# Patient Record
Sex: MALE | Race: WHITE | Employment: FULL TIME | URBAN - METROPOLITAN AREA
[De-identification: names, ages, dates, MRNs, and addresses within clinical notes are randomized per-mention and may not be internally consistent; named-entity substitution may affect disease eponyms.]

---

## 2018-09-07 ENCOUNTER — HOSPITAL ENCOUNTER (EMERGENCY)
Age: 38
Discharge: LEFT BEFORE DISCHARGE | End: 2018-09-07
Attending: EMERGENCY MEDICINE
Payer: SELF-PAY

## 2018-09-07 VITALS
HEIGHT: 71 IN | HEART RATE: 77 BPM | TEMPERATURE: 97.9 F | OXYGEN SATURATION: 99 % | BODY MASS INDEX: 28 KG/M2 | RESPIRATION RATE: 16 BRPM | DIASTOLIC BLOOD PRESSURE: 88 MMHG | SYSTOLIC BLOOD PRESSURE: 126 MMHG | WEIGHT: 200 LBS

## 2018-09-07 PROCEDURE — 75810000275 HC EMERGENCY DEPT VISIT NO LEVEL OF CARE

## 2018-09-07 NOTE — ED NOTES
Patient reports URI last week. Rash x 4 days, and worsening diffuse joint swelling most notable to hands. Also left periorbital swelling starting today. Patient works outside I performed a brief evaluation, including history and physical, of the patient here in triage and I have determined that pt will need further treatment and evaluation from the main side ER physician. I have placed initial orders to help in expediting patients care. September 07, 2018 at 4:31 PM - Tara Alexis NP Visit Vitals  /88 (BP 1 Location: Right arm, BP Patient Position: At rest)  Pulse 77  Temp 97.9 °F (36.6 °C)  Resp 16  
 Ht 5' 11\" (1.803 m)  Wt 90.7 kg (200 lb)  SpO2 99%  BMI 27.89 kg/m2

## 2018-09-07 NOTE — ED TRIAGE NOTES
Last week sick with URI. This week broke out with rash all over, joint swelling and hands and face swollen. 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 Itches

## 2018-09-08 ENCOUNTER — HOSPITAL ENCOUNTER (EMERGENCY)
Age: 38
Discharge: HOME | End: 2018-09-08
Attending: EMERGENCY MEDICINE
Payer: SELF-PAY

## 2018-09-08 VITALS
WEIGHT: 199.96 LBS | HEART RATE: 70 BPM | TEMPERATURE: 97.7 F | RESPIRATION RATE: 16 BRPM | DIASTOLIC BLOOD PRESSURE: 99 MMHG | HEIGHT: 71 IN | BODY MASS INDEX: 27.99 KG/M2 | OXYGEN SATURATION: 100 % | SYSTOLIC BLOOD PRESSURE: 148 MMHG

## 2018-09-08 DIAGNOSIS — L01.00 IMPETIGO: Primary | ICD-10-CM

## 2018-09-08 LAB
ANION GAP SERPL CALC-SCNC: 5 MMOL/L (ref 3–18)
BASOPHILS # BLD: 0.1 K/UL (ref 0–0.1)
BASOPHILS NFR BLD: 1 % (ref 0–2)
BUN SERPL-MCNC: 14 MG/DL (ref 7–18)
BUN/CREAT SERPL: 19 (ref 12–20)
CALCIUM SERPL-MCNC: 8.6 MG/DL (ref 8.5–10.1)
CHLORIDE SERPL-SCNC: 110 MMOL/L (ref 100–108)
CO2 SERPL-SCNC: 27 MMOL/L (ref 21–32)
CREAT SERPL-MCNC: 0.73 MG/DL (ref 0.6–1.3)
CRP SERPL-MCNC: 0.6 MG/DL (ref 0–0.3)
DIFFERENTIAL METHOD BLD: ABNORMAL
EOSINOPHIL # BLD: 0.2 K/UL (ref 0–0.4)
EOSINOPHIL NFR BLD: 4 % (ref 0–5)
ERYTHROCYTE [DISTWIDTH] IN BLOOD BY AUTOMATED COUNT: 13.3 % (ref 11.6–14.5)
ERYTHROCYTE [SEDIMENTATION RATE] IN BLOOD: 4 MM/HR (ref 0–15)
GLUCOSE SERPL-MCNC: 90 MG/DL (ref 74–99)
HCT VFR BLD AUTO: 43.9 % (ref 36–48)
HGB BLD-MCNC: 14.1 G/DL (ref 13–16)
LYMPHOCYTES # BLD: 2.2 K/UL (ref 0.9–3.6)
LYMPHOCYTES NFR BLD: 38 % (ref 21–52)
MCH RBC QN AUTO: 28 PG (ref 24–34)
MCHC RBC AUTO-ENTMCNC: 32.1 G/DL (ref 31–37)
MCV RBC AUTO: 87.1 FL (ref 74–97)
MONOCYTES # BLD: 0.8 K/UL (ref 0.05–1.2)
MONOCYTES NFR BLD: 14 % (ref 3–10)
NEUTS SEG # BLD: 2.5 K/UL (ref 1.8–8)
NEUTS SEG NFR BLD: 43 % (ref 40–73)
PLATELET # BLD AUTO: 283 K/UL (ref 135–420)
PMV BLD AUTO: 9.3 FL (ref 9.2–11.8)
POTASSIUM SERPL-SCNC: 4.2 MMOL/L (ref 3.5–5.5)
RBC # BLD AUTO: 5.04 M/UL (ref 4.7–5.5)
SODIUM SERPL-SCNC: 142 MMOL/L (ref 136–145)
WBC # BLD AUTO: 5.8 K/UL (ref 4.6–13.2)

## 2018-09-08 PROCEDURE — 86140 C-REACTIVE PROTEIN: CPT | Performed by: NURSE PRACTITIONER

## 2018-09-08 PROCEDURE — 85025 COMPLETE CBC W/AUTO DIFF WBC: CPT | Performed by: NURSE PRACTITIONER

## 2018-09-08 PROCEDURE — 85652 RBC SED RATE AUTOMATED: CPT | Performed by: NURSE PRACTITIONER

## 2018-09-08 PROCEDURE — 99282 EMERGENCY DEPT VISIT SF MDM: CPT

## 2018-09-08 PROCEDURE — 80048 BASIC METABOLIC PNL TOTAL CA: CPT | Performed by: NURSE PRACTITIONER

## 2018-09-08 RX ORDER — CLARITHROMYCIN 500 MG/1
500 TABLET, FILM COATED ORAL 2 TIMES DAILY
Qty: 14 TAB | Refills: 0 | Status: SHIPPED | OUTPATIENT
Start: 2018-09-08 | End: 2018-09-15

## 2018-09-08 NOTE — DISCHARGE INSTRUCTIONS
Impetigo: Care Instructions  Your Care Instructions  Impetigo (say \"ht-wln-EP-go\") is a skin infection caused by bacteria. It causes blisters that break and become oozing, yellow, crusty sores. Impetigo can be anywhere on the body. Scratching the sores may spread the infection to other parts of the body. You can also spread it to others through close contact or when you share towels, clothing, and other items. Prescription antibiotic ointment or pills can usually cure impetigo. (After a day of antibiotics, the infection should not spread.)  Follow-up care is a key part of your treatment and safety. Be sure to make and go to all appointments, and call your doctor if you are having problems. It's also a good idea to know your test results and keep a list of the medicines you take. How can you care for yourself at home? · Apply antibiotic ointment exactly as instructed. · If your doctor prescribed antibiotic pills, take them as directed. Do not stop using them just because you feel better. You need to take the full course of antibiotics. · Gently wash the sores with soap and water each day. If crusts form, your doctor may advise you to soften or remove the crusts. You can do this by soaking them in warm water and patting them dry. This can help the cream or ointment treat impetigo. · After you touch the area, wash your hands with soap and water. Or you can use an alcohol-based hand . · Don't share items such as towels, sheets, and clothing until the infection is gone. · Wash anything that may have touched the infected area. · Try to avoid scratching the area. When should you call for help? Call your doctor now or seek immediate medical care if:    · You have symptoms of a worse infection, such as:  ¨ Increased pain, swelling, warmth, or redness. ¨ Red streaks leading from the area. ¨ Pus draining from the area.   ¨ A fever.     · Impetigo gets worse or spreads to other areas.    Watch closely for changes in your health, and be sure to contact your doctor if:    · You do not get better as expected. Where can you learn more? Go to http://jenn-billy.info/. Enter N515 in the search box to learn more about \"Impetigo: Care Instructions. \"  Current as of: May 12, 2017  Content Version: 11.7  © 1254-7598 [employer]. Care instructions adapted under license by [employer] (which disclaims liability or warranty for this information). If you have questions about a medical condition or this instruction, always ask your healthcare professional. [place] [address] any warranty or liability for your use of this information.

## 2018-09-08 NOTE — ED NOTES
Rash to torso - burning and itching x 4 days. Joint swelling and pain, most notable hands. And some swelling around left eye

## 2018-09-08 NOTE — ED PROVIDER NOTES
EMERGENCY DEPARTMENT HISTORY AND PHYSICAL EXAM 
 
2:06 PM 
 
 
Date: 9/8/2018 Patient Name: Massimo Silveira History of Presenting Illness Chief Complaint Patient presents with  Rash History Provided By: Patient Chief Complaint: Rash Duration: 1 Weeks Timing:  Constant and Progressive Location: Chest, back Quality: Burning and pruritic Severity: Severe Modifying Factors: none reported Associated Symptoms: joint swelling, arthralgia, congestion Additional History (Context): Massimo Silveira is a 40 y.o. male with No significant past medical history who presents with burning and pruritic rash to torso and back starting 1wk ago. Associated sx include eye swelling, arthralgia and joint swelling. Pt notes congestion, illness 2 wks ago with suspected viral infection; as soon as he improved from illness pt started to develop rash with \"welts that ooze\" and occasionally crust over yellow. No ill-contact, no new detergent. PCP: None Past History Past Medical History: 
Past Medical History:  
Diagnosis Date  Scabies Past Surgical History: 
History reviewed. No pertinent surgical history. Family History: 
History reviewed. No pertinent family history. Social History: 
Social History Substance Use Topics  Smoking status: Current Every Day Smoker Packs/day: 0.25  Smokeless tobacco: Never Used  Alcohol use Yes Allergies: Allergies Allergen Reactions  Pcn [Penicillins] Angioedema Any type of cillin Review of Systems Review of Systems Constitutional: Negative for chills and fever. HENT: Positive for congestion, facial swelling (eyes), rhinorrhea and sinus pressure. Respiratory: Negative for shortness of breath. Cardiovascular: Negative for chest pain. Gastrointestinal: Negative for nausea and vomiting. Musculoskeletal: Positive for arthralgias and joint swelling. Skin: Positive for rash. All other systems reviewed and are negative. Physical Exam  
 
Visit Vitals  BP (!) 148/99  Pulse 70  Temp 97.7 °F (36.5 °C)  Resp 16  
 Ht 5' 11\" (1.803 m)  Wt 90.7 kg (199 lb 15.3 oz)  SpO2 100%  BMI 27.89 kg/m2 Physical Exam  
Constitutional: He is oriented to person, place, and time. He appears well-developed and well-nourished. No distress. HENT:  
Head: Normocephalic and atraumatic. Eyes: Conjunctivae and EOM are normal. Right eye exhibits no discharge. Left eye exhibits no discharge. No scleral icterus. Neck: Normal range of motion. Neck supple. No tracheal deviation present. Cardiovascular: Normal rate, regular rhythm and normal heart sounds. No murmur heard. Pulmonary/Chest: Effort normal and breath sounds normal. No respiratory distress. He has no wheezes. He has no rales. Abdominal: Soft. He exhibits no distension. There is no tenderness. There is no rebound and no guarding. Musculoskeletal: Normal range of motion. He exhibits no edema or deformity. Neurological: He is alert and oriented to person, place, and time. No cranial nerve deficit. Skin: Skin is warm and dry. He is not diaphoretic. Pustular rash with excoriations on the chest back and flexoral surfaces of arms Psychiatric: He has a normal mood and affect. His behavior is normal. Judgment and thought content normal.  
 
 
 
Diagnostic Study Results Labs - Recent Results (from the past 12 hour(s)) CBC WITH AUTOMATED DIFF Collection Time: 09/08/18  1:55 PM  
Result Value Ref Range WBC 5.8 4.6 - 13.2 K/uL  
 RBC 5.04 4.70 - 5.50 M/uL  
 HGB 14.1 13.0 - 16.0 g/dL HCT 43.9 36.0 - 48.0 % MCV 87.1 74.0 - 97.0 FL  
 MCH 28.0 24.0 - 34.0 PG  
 MCHC 32.1 31.0 - 37.0 g/dL  
 RDW 13.3 11.6 - 14.5 % PLATELET 726 305 - 836 K/uL MPV 9.3 9.2 - 11.8 FL  
 NEUTROPHILS 43 40 - 73 % LYMPHOCYTES 38 21 - 52 % MONOCYTES 14 (H) 3 - 10 % EOSINOPHILS 4 0 - 5 % BASOPHILS 1 0 - 2 %  
 ABS. NEUTROPHILS 2.5 1.8 - 8.0 K/UL  
 ABS. LYMPHOCYTES 2.2 0.9 - 3.6 K/UL  
 ABS. MONOCYTES 0.8 0.05 - 1.2 K/UL  
 ABS. EOSINOPHILS 0.2 0.0 - 0.4 K/UL  
 ABS. BASOPHILS 0.1 0.0 - 0.1 K/UL  
 DF AUTOMATED METABOLIC PANEL, BASIC Collection Time: 09/08/18  1:55 PM  
Result Value Ref Range Sodium 142 136 - 145 mmol/L Potassium 4.2 3.5 - 5.5 mmol/L Chloride 110 (H) 100 - 108 mmol/L  
 CO2 27 21 - 32 mmol/L Anion gap 5 3.0 - 18 mmol/L Glucose 90 74 - 99 mg/dL BUN 14 7.0 - 18 MG/DL Creatinine 0.73 0.6 - 1.3 MG/DL  
 BUN/Creatinine ratio 19 12 - 20 GFR est AA >60 >60 ml/min/1.73m2 GFR est non-AA >60 >60 ml/min/1.73m2 Calcium 8.6 8.5 - 10.1 MG/DL  
SED RATE (ESR) Collection Time: 09/08/18  1:55 PM  
Result Value Ref Range Sed rate, automated 4 0 - 15 mm/hr C REACTIVE PROTEIN, QT Collection Time: 09/08/18  1:55 PM  
Result Value Ref Range C-Reactive protein 0.6 (H) 0 - 0.3 mg/dL Radiologic Studies - No orders to display Medical Decision Making I am the first provider for this patient. I reviewed the vital signs, available nursing notes, past medical history, past surgical history, family history and social history. Vital Signs-Reviewed the patient's vital signs. Pulse Oximetry Analysis -  100% on room air (Interpretation) wnl 
 
Cardiac Monitor: 
Rate: 70 Records Reviewed: Nursing Notes (Time of Review: 2:06 PM) Provider Notes (Medical Decision Making): Pt with impetigo with penicillin allergy and anaphylaxis, will be DC with clarithromycin and PCP follow up. Diagnosis Clinical Impression: 1. Impetigo Disposition: Discharge Follow-up Information Follow up With Details Comments Contact formerly [facility] EMERGENCY DEPT  If symptoms worsen [address] [phone] Patient's Medications Start Taking CLARITHROMYCIN (BIAXIN) 500 MG TABLET    Take 1 Tab by mouth two (2) times a day for 7 days. Continue Taking No medications on file These Medications have changed No medications on file Stop Taking No medications on file  
 
_______________________________ Attestations: 
Scribe Attestation Parmjit Garduno acting as a scribe for and in the presence of Ricardo Russo MD     
September 08, 2018 at 3:12 PM 
    
Provider Attestation:     
I personally performed the services described in the documentation, reviewed the documentation, as recorded by the scribe in my presence, and it accurately and completely records my words and actions. September 08, 2018 at 3:12 PM - Ricardo Russo MD   
_______________________________

## 2018-09-17 ENCOUNTER — HOSPITAL ENCOUNTER (EMERGENCY)
Age: 38
Discharge: HOME | End: 2018-09-17
Attending: EMERGENCY MEDICINE
Payer: SELF-PAY

## 2018-09-17 VITALS
WEIGHT: 190 LBS | HEART RATE: 95 BPM | TEMPERATURE: 98.1 F | RESPIRATION RATE: 16 BRPM | BODY MASS INDEX: 26.6 KG/M2 | HEIGHT: 71 IN | OXYGEN SATURATION: 97 % | SYSTOLIC BLOOD PRESSURE: 135 MMHG | DIASTOLIC BLOOD PRESSURE: 102 MMHG

## 2018-09-17 DIAGNOSIS — L01.00 IMPETIGO: Primary | ICD-10-CM

## 2018-09-17 DIAGNOSIS — R03.0 ELEVATED BLOOD PRESSURE READING: ICD-10-CM

## 2018-09-17 PROCEDURE — 99282 EMERGENCY DEPT VISIT SF MDM: CPT

## 2018-09-17 RX ORDER — CLARITHROMYCIN 500 MG/1
500 TABLET, FILM COATED ORAL 2 TIMES DAILY
Qty: 14 TAB | Refills: 0 | Status: SHIPPED | OUTPATIENT
Start: 2018-09-17 | End: 2018-09-24

## 2018-09-18 NOTE — ED PROVIDER NOTES
EMERGENCY DEPARTMENT HISTORY AND PHYSICAL EXAM 
 
8:36 PM 
 
 
Date: 9/17/2018 Patient Name: Melody Cruz History of Presenting Illness Chief Complaint Patient presents with  Rash History Provided By: Patient Chief Complaint: rash Duration:  Weeks Timing:  Improving Location: chest, neck, left arm Quality: \"irritated\" Severity: Moderate Modifying Factors: No modifying or aggravating factors were reported. Associated Symptoms: denies any other associated signs or symptoms Additional History (Context):Vamsi Gautam is a 40 y.o. male with a pertinent history of scabies who presents to the emergency department for evaluation of moderate irritated chest, neck, left arm rash. Pt was seen in the ED and did a 7 days worth of medications and says that the the medications did help. Says that this time around the rash looks much better but it is more itchy. No fever, chills, n/v/d, or urinary symptoms. No modifying or aggravating factors were reported. No other concerns or symptoms at this time. PCP:  None Past History Past Medical History: 
Past Medical History:  
Diagnosis Date  Scabies Past Surgical History: 
History reviewed. No pertinent surgical history. Family History: 
History reviewed. No pertinent family history. Social History: 
Social History Substance Use Topics  Smoking status: Current Every Day Smoker Packs/day: 0.25  Smokeless tobacco: Never Used  Alcohol use Yes Allergies: Allergies Allergen Reactions  Pcn [Penicillins] Angioedema Any type of cillin Review of Systems Review of Systems Constitutional: Negative for chills and fever. HENT: Negative for congestion, rhinorrhea and sore throat. Eyes: Negative for visual disturbance. Respiratory: Negative for cough and shortness of breath. Cardiovascular: Negative for chest pain and palpitations. Gastrointestinal: Negative for abdominal pain, nausea and vomiting. Musculoskeletal: Positive for back pain. Negative for myalgias. Skin: Positive for rash (chest, left arm, neck). Allergic/Immunologic: Negative. Neurological: Negative for headaches. Physical Exam  
 
Visit Vitals  BP (!) 135/102 (BP 1 Location: Right arm)  Pulse 95  Temp 98.1 °F (36.7 °C)  Resp 16  
 Ht 5' 11\" (1.803 m)  Wt 86.2 kg (190 lb)  SpO2 97%  BMI 26.5 kg/m2 Physical Exam  
Constitutional: He is oriented to person, place, and time. He appears well-developed and well-nourished. No distress. HENT:  
Head: Normocephalic and atraumatic. Eyes: Conjunctivae and EOM are normal. Pupils are equal, round, and reactive to light. Right eye exhibits no discharge. Left eye exhibits no discharge. Neck: Normal range of motion. Neck supple. No thyromegaly present. Cardiovascular: Normal rate, regular rhythm and normal heart sounds. Pulmonary/Chest: Effort normal and breath sounds normal. No respiratory distress. He has no wheezes. He has no rales. He exhibits no tenderness. Musculoskeletal: Normal range of motion. He exhibits no edema or deformity. Lymphadenopathy:  
  He has no cervical adenopathy. Neurological: He is alert and oriented to person, place, and time. Skin: Skin is warm and dry. Rash noted. He is not diaphoretic. Erythematous, crusted lesions of varying sizes noted to anterior trunk and left arm. Psychiatric: He has a normal mood and affect. Nursing note and vitals reviewed. Diagnostic Study Results Labs - No results found for this or any previous visit (from the past 12 hour(s)). Radiologic Studies - No results found. Medical Decision Making I am the first provider for this patient. I reviewed the vital signs, available nursing notes, past medical history, past surgical history, family history and social history. Vital Signs-Reviewed the patient's vital signs. Pulse Oximetry Analysis -  97% on room air - stable Records Reviewed: Nursing Notes (Time of Review: 8:36 PM) 
 
ED Course: Progress Notes, Reevaluation, and Consults: 
 
 
Provider Notes (Medical Decision Making):  
Differential Diagnosis:  Impetigo, tinea, dry skin, scabies, allergic reaction Plan:  Pt presents in NAD, elevated BP with otherwise normal vitals. Exam c/w improving impetigo. Will continue clarithromycin. At this time, patient is stable and appropriate for discharge home. Patient demonstrates understanding of current diagnoses and is in agreement with the treatment plan. They are advised that while the likelihood of serious underlying condition is low at this point given the evaluation performed today, we cannot fully rule it out. They are advised to immediately return with any new symptoms or worsening of current condition. All questions have been answered. Patient is given educational material regarding their diagnoses, including danger symptoms and when to return to the ED. Diagnosis Clinical Impression: 1. Impetigo 2. Elevated blood pressure reading Disposition: discharged Follow-up Information Follow up With Details Comments Contact Info Your primary care provider Call in 2 days For follow-up [facility] EMERGENCY DEPT Go to As needed, If symptoms worsen [address] 
[phone] Patient's Medications Start Taking CLARITHROMYCIN (BIAXIN) 500 MG TABLET    Take 1 Tab by mouth two (2) times a day for 7 days. Continue Taking No medications on file These Medications have changed No medications on file Stop Taking No medications on file  
 
_______________________________

## 2018-09-18 NOTE — DISCHARGE INSTRUCTIONS
Impetigo: Care Instructions  Your Care Instructions  Impetigo (say \"np-zua-TE-go\") is a skin infection caused by bacteria. It causes blisters that break and become oozing, yellow, crusty sores. Impetigo can be anywhere on the body. Scratching the sores may spread the infection to other parts of the body. You can also spread it to others through close contact or when you share towels, clothing, and other items. Prescription antibiotic ointment or pills can usually cure impetigo. (After a day of antibiotics, the infection should not spread.)  Follow-up care is a key part of your treatment and safety. Be sure to make and go to all appointments, and call your doctor if you are having problems. It's also a good idea to know your test results and keep a list of the medicines you take. How can you care for yourself at home? · Apply antibiotic ointment exactly as instructed. · If your doctor prescribed antibiotic pills, take them as directed. Do not stop using them just because you feel better. You need to take the full course of antibiotics. · Gently wash the sores with soap and water each day. If crusts form, your doctor may advise you to soften or remove the crusts. You can do this by soaking them in warm water and patting them dry. This can help the cream or ointment treat impetigo. · After you touch the area, wash your hands with soap and water. Or you can use an alcohol-based hand . · Don't share items such as towels, sheets, and clothing until the infection is gone. · Wash anything that may have touched the infected area. · Try to avoid scratching the area. When should you call for help? Call your doctor now or seek immediate medical care if:    · You have symptoms of a worse infection, such as:  ¨ Increased pain, swelling, warmth, or redness. ¨ Red streaks leading from the area. ¨ Pus draining from the area.   ¨ A fever.     · Impetigo gets worse or spreads to other areas.    Watch closely for changes in your health, and be sure to contact your doctor if:    · You do not get better as expected. Where can you learn more? Go to http://jenn-blily.info/. Enter U876 in the search box to learn more about \"Impetigo: Care Instructions. \"  Current as of: May 12, 2017  Content Version: 11.7  © 3853-1109 [employer]. Care instructions adapted under license by [employer] (which disclaims liability or warranty for this information). If you have questions about a medical condition or this instruction, always ask your healthcare professional. [place] [address] any warranty or liability for your use of this information.

## 2018-09-18 NOTE — ED TRIAGE NOTES
DX with impentigo and placed on antibiotics for 7 days. Completed course, rash improved but now has begun to spread again.

## 2020-10-30 ENCOUNTER — HOSPITAL ENCOUNTER (EMERGENCY)
Age: 40
Discharge: HOME | End: 2020-10-31
Attending: EMERGENCY MEDICINE
Payer: COMMERCIAL

## 2020-10-30 ENCOUNTER — APPOINTMENT (OUTPATIENT)
Dept: GENERAL RADIOLOGY | Age: 40
End: 2020-10-30
Attending: EMERGENCY MEDICINE
Payer: COMMERCIAL

## 2020-10-30 DIAGNOSIS — F19.20 POLYSUBSTANCE (EXCLUDING OPIOIDS) DEPENDENCE, DAILY USE (HCC): Primary | ICD-10-CM

## 2020-10-30 DIAGNOSIS — T40.601A OPIATE OVERDOSE, ACCIDENTAL OR UNINTENTIONAL, INITIAL ENCOUNTER (HCC): ICD-10-CM

## 2020-10-30 DIAGNOSIS — J96.01 ACUTE HYPOXEMIC RESPIRATORY FAILURE (HCC): ICD-10-CM

## 2020-10-30 PROCEDURE — 96374 THER/PROPH/DIAG INJ IV PUSH: CPT

## 2020-10-30 PROCEDURE — 71045 X-RAY EXAM CHEST 1 VIEW: CPT

## 2020-10-30 PROCEDURE — 74011250636 HC RX REV CODE- 250/636: Performed by: EMERGENCY MEDICINE

## 2020-10-30 PROCEDURE — 99285 EMERGENCY DEPT VISIT HI MDM: CPT

## 2020-10-30 RX ORDER — NALOXONE HYDROCHLORIDE 0.4 MG/ML
0.04 INJECTION, SOLUTION INTRAMUSCULAR; INTRAVENOUS; SUBCUTANEOUS
Status: COMPLETED | OUTPATIENT
Start: 2020-10-30 | End: 2020-10-30

## 2020-10-30 RX ORDER — NALOXONE HYDROCHLORIDE 4 MG/.1ML
SPRAY NASAL
Qty: 1 EACH | Refills: 3 | Status: SHIPPED | OUTPATIENT
Start: 2020-10-30 | End: 2020-12-28 | Stop reason: ALTCHOICE

## 2020-10-30 RX ADMIN — NALOXONE HYDROCHLORIDE 0.04 MG: 0.4 INJECTION, SOLUTION INTRAMUSCULAR; INTRAVENOUS; SUBCUTANEOUS at 23:06

## 2020-10-31 VITALS
TEMPERATURE: 99.5 F | DIASTOLIC BLOOD PRESSURE: 58 MMHG | BODY MASS INDEX: 29.4 KG/M2 | OXYGEN SATURATION: 98 % | WEIGHT: 210 LBS | SYSTOLIC BLOOD PRESSURE: 110 MMHG | RESPIRATION RATE: 22 BRPM | HEART RATE: 117 BPM | HEIGHT: 71 IN

## 2020-10-31 LAB
ANION GAP SERPL CALC-SCNC: 3 MMOL/L (ref 3–18)
ARTERIAL PATENCY WRIST A: YES
BASE EXCESS BLD CALC-SCNC: 2 MMOL/L
BASOPHILS # BLD: 0 K/UL (ref 0–0.1)
BASOPHILS NFR BLD: 0 % (ref 0–2)
BDY SITE: ABNORMAL
BODY TEMPERATURE: 98.4
BUN SERPL-MCNC: 17 MG/DL (ref 7–18)
BUN/CREAT SERPL: 18 (ref 12–20)
CALCIUM SERPL-MCNC: 7.9 MG/DL (ref 8.5–10.1)
CHLORIDE SERPL-SCNC: 103 MMOL/L (ref 100–111)
CO2 SERPL-SCNC: 29 MMOL/L (ref 21–32)
CREAT SERPL-MCNC: 0.92 MG/DL (ref 0.6–1.3)
DIFFERENTIAL METHOD BLD: ABNORMAL
EOSINOPHIL # BLD: 0 K/UL (ref 0–0.4)
EOSINOPHIL NFR BLD: 0 % (ref 0–5)
ERYTHROCYTE [DISTWIDTH] IN BLOOD BY AUTOMATED COUNT: 13.6 % (ref 11.6–14.5)
GAS FLOW.O2 O2 DELIVERY SYS: ABNORMAL L/MIN
GAS FLOW.O2 SETTING OXYMISER: 2 L/M
GLUCOSE SERPL-MCNC: 115 MG/DL (ref 74–99)
HCO3 BLD-SCNC: 27.3 MMOL/L (ref 22–26)
HCT VFR BLD AUTO: 36.1 % (ref 36–48)
HGB BLD-MCNC: 11.5 G/DL (ref 13–16)
LYMPHOCYTES # BLD: 1 K/UL (ref 0.9–3.6)
LYMPHOCYTES NFR BLD: 7 % (ref 21–52)
MCH RBC QN AUTO: 26.9 PG (ref 24–34)
MCHC RBC AUTO-ENTMCNC: 31.9 G/DL (ref 31–37)
MCV RBC AUTO: 84.3 FL (ref 74–97)
MONOCYTES # BLD: 2.1 K/UL (ref 0.05–1.2)
MONOCYTES NFR BLD: 16 % (ref 3–10)
NEUTS SEG # BLD: 10 K/UL (ref 1.8–8)
NEUTS SEG NFR BLD: 77 % (ref 40–73)
O2/TOTAL GAS SETTING VFR VENT: 28 %
PCO2 BLD: 46.4 MMHG (ref 35–45)
PH BLD: 7.38 [PH] (ref 7.35–7.45)
PLATELET # BLD AUTO: 292 K/UL (ref 135–420)
PMV BLD AUTO: 8.5 FL (ref 9.2–11.8)
PO2 BLD: 101 MMHG (ref 80–100)
POTASSIUM SERPL-SCNC: 4.2 MMOL/L (ref 3.5–5.5)
RBC # BLD AUTO: 4.28 M/UL (ref 4.7–5.5)
SAO2 % BLD: 98 % (ref 92–97)
SERVICE CMNT-IMP: ABNORMAL
SODIUM SERPL-SCNC: 135 MMOL/L (ref 136–145)
SPECIMEN TYPE: ABNORMAL
TOTAL RESP. RATE, ITRR: 20
WBC # BLD AUTO: 13.1 K/UL (ref 4.6–13.2)

## 2020-10-31 PROCEDURE — 36600 WITHDRAWAL OF ARTERIAL BLOOD: CPT

## 2020-10-31 PROCEDURE — 80048 BASIC METABOLIC PNL TOTAL CA: CPT

## 2020-10-31 PROCEDURE — 85025 COMPLETE CBC W/AUTO DIFF WBC: CPT

## 2020-10-31 PROCEDURE — 82803 BLOOD GASES ANY COMBINATION: CPT

## 2020-10-31 RX ORDER — BUPRENORPHINE AND NALOXONE 8; 2 MG/1; MG/1
1 FILM, SOLUBLE BUCCAL; SUBLINGUAL ONCE
Status: DISCONTINUED | OUTPATIENT
Start: 2020-10-31 | End: 2020-10-31 | Stop reason: HOSPADM

## 2020-10-31 RX ORDER — BUPRENORPHINE AND NALOXONE 8; 2 MG/1; MG/1
1 FILM, SOLUBLE BUCCAL; SUBLINGUAL DAILY
Status: DISCONTINUED | OUTPATIENT
Start: 2020-10-31 | End: 2020-10-31

## 2020-10-31 NOTE — ED NOTES
MD aware pt desires to leave, will see pt momentarily. Pt on RA at this time. Maintaining saturations at 92-93% while awake.

## 2020-10-31 NOTE — ED NOTES
I have reviewed discharge instructions with the patient. The patient verbalized understanding. Patient armband removed and shredded. VSS. Patient verbalized understanding of how to properly take any prescribed medications. Patient ambulatory to front lobby to ride at front entrance.

## 2020-10-31 NOTE — ED NOTES
Pt's emergency contact called. She is requesting that the doctor give him a refill of his suboxone until he is able to see his PCP. Told her that I will inform the physician, but there is no guarantee as that is at the MD's discretion. Understanding at this time.

## 2020-10-31 NOTE — DISCHARGE INSTRUCTIONS
Patient Education     As I am concerned you could have a blood clot in your lungs, please return if you change your mind for further evaluation. Use of Multiple Drugs: Care Instructions  Your Care Instructions     You have had treatment to help your body get rid of a combination of any of these drugs:  · Prescription medicines  · Over-the-counter medicines  · Alcohol  · Illegal drugs  Taking some drugs together may cause a bad reaction. They can have unexpected or stronger effects on your body and mind. For example, benzodiazepines (such as alprazolam and lorazepam) and alcohol both depress the nervous system. Taken together, each one is stronger than when it is taken by itself. You are getting better, but it takes time for the drugs to leave your body. It may take up to 2 weeks for your mind to clear and your mood to improve. Depending on the drugs you took, the doctor might have:  · Watched your symptoms or done tests to find out what drugs were in your body. · Treated you to control your breathing, blood pressure, and heart rate. · Tried to remove the drugs from your body by pumping your stomach or giving you a substance by mouth that absorbs chemicals. · Given you a substance that neutralizes chemicals (antidote). · Given you oxygen to help you breathe. · Given you fluids. The doctor also watched you carefully to make sure you were recovering safely. Follow-up care is a key part of your treatment and safety. Be sure to make and go to all appointments, and call your doctor if you are having problems. It's also a good idea to know your test results and keep a list of the medicines you take. How can you care for yourself at home? · Adopt healthy habits to ease withdrawal symptoms. When you use substances like alcohol and some drugs regularly, your body gets used to them. This is called physical dependence.  If you are physically dependent on substances, you may have withdrawal symptoms when you stop taking them. These symptoms may include trembling, feeling restless, and sweating. To help get past these:  ? Get plenty of rest.  ? Drink lots of fluids. ? Stay active. ? Eat a healthy diet. · Drink fluids to soothe a sore throat. If you had a tube in your throat to help you breathe, you may have a sore throat or hoarseness that can last a few days. Drinking fluids may help. · If you use opioids, ask your doctor or pharmacist about having a naloxone rescue kit on hand. · Get help to stop using drugs. Talk to your doctor about substance use treatment programs. When should you call for help? Call 911 anytime you think you may need emergency care. For example, call if:    · You feel you cannot stop from hurting yourself or someone else. Call your doctor now or seek immediate medical care if:    · You have new or worse symptoms of withdrawal, such as trembling, feeling restless, and sweating, that you can't manage at home. Watch closely for changes in your health, and be sure to contact your doctor if:    · You do not get better as expected.     · You need help finding the right place to get help with drug or alcohol problems. Where can you learn more? Go to http://www.gray.com/  Enter B109 in the search box to learn more about \"Use of Multiple Drugs: Care Instructions. \"  Current as of: June 29, 2020               Content Version: 12.6  © 0264-4553 [employer]. Care instructions adapted under license by [employer] (which disclaims liability or warranty for this information). If you have questions about a medical condition or this instruction, always ask your healthcare professional. [address] any warranty or liability for your use of this information.

## 2020-10-31 NOTE — ED TRIAGE NOTES
Alert male to the ED with a c/c of drug overdose. Per EMS, allegedly took adderall, meth, and heroin today. Family called medics as he was in the bathroom \"for several hours\" and was found by EMS minimally responsive. Arrives to ED rambling, appears manic, unable to stay on subject. Discussing his \"skin problems\" with the ED MD, tearful about his girlfriend. Avoiding discussion about drug use.

## 2020-10-31 NOTE — ED NOTES
Orders for . 04mg narcan. MD notified pt does not have a line, verbal orders to change admin to IM. 2305: Narcan administered in R deltoid per provider's order.  Pt at 90% @ 4L NC.

## 2020-10-31 NOTE — ED PROVIDER NOTES
EMERGENCY DEPARTMENT HISTORY AND PHYSICAL EXAM      Date: 10/30/2020  Patient Name: June Burdick    History of Presenting Illness     Chief Complaint   Patient presents with    Drug Overdose       History (Context): June Burdick is a 36 y.o. gentleman with polysubstance use disorder, who presents via EMS after acute onset, severe, persistent loss of consciousness, improved with administration of intranasal Narcan    On review of systems, the patient denies fever, chills, rashes, nausea, vomiting, diarrhea. PCP: None        Past History     Past Medical History:  Past Medical History:   Diagnosis Date    Scabies        Past Surgical History:  History reviewed. No pertinent surgical history. Family History:  History reviewed. No pertinent family history. Social History:  Social History     Tobacco Use    Smoking status: Current Every Day Smoker     Packs/day: 0.25    Smokeless tobacco: Never Used   Substance Use Topics    Alcohol use: Yes    Drug use: Yes     Types: Heroin, Methamphetamines, Prescription     Comment: adderal       Allergies: Allergies   Allergen Reactions    Pcn [Penicillins] Angioedema     Any type of cillin       PMH, PSH, family history, social history, allergies reviewed with the patient with significant items noted above. Review of Systems   As per HPI, otherwise reviewed and negative. Physical Exam     Vitals:    10/31/20 0016 10/31/20 0052 10/31/20 0103 10/31/20 0106   BP: (!) 110/58      Pulse: (!) 120 (!) 114 (!) 117 (!) 117   Resp:       Temp:       SpO2: 96% 94% 96% 98%   Weight:       Height:           Gen: Mildly ill-appearing, in no acute distress  HEENT: Normocephalic, sclera anicteric, pupils pinpoint bilaterally  Cardiovascular: Tachycardic, regular rhythm, no murmurs, rubs, gallops. Pulses intact and equal distally. Pulmonary: No respiratory distress. No stridor. Clear lungs. ABD: Soft, nontender, nondistended. Neuro: Alert. Slurred speech.   Altered mentation. Psych: Normal thought content and thought processes. : No CVA tenderness  EXT: Moves all extremities well. No cyanosis or clubbing. Skin: Warm and well-perfused. Diagnostic Study Results     Labs -   No results found for this or any previous visit (from the past 12 hour(s)). Radiologic Studies -   XR CHEST PORT   Final Result   IMPRESSION:      Minimal discoid atelectasis or scarring lateral to the right hilum. Otherwise   normal chest.        CT Results  (Last 48 hours)    None        CXR Results  (Last 48 hours)    None            Medical Decision Making   I am the first provider for this patient. I reviewed the vital signs, available nursing notes, past medical history, past surgical history, family history and social history. Vital Signs-Reviewed the patient's vital signs. EKG: Interpreted by myself. Records Reviewed: Personally, on initial evaluation    MDM:   Patient presents with altered mental status in the setting of opiate use. Exam significant for pinpoint pupils, altered mental status, breathing normally. Status post Narcan  DDX considered: Opiate overdose  DDX thought to be less likely but also considered due to high risk condition: Brainstem stroke, brainstem bleed    Patient condition on initial evaluation: Moderately ill    Plan:   Close observation  Orders as below:  Orders Placed This Encounter    XR CHEST PORT    BASIC METABOLIC PANEL    CBC WITH AUTOMATED DIFF    BLOOD GAS, ARTERIAL    POC G3    naloxone (Narcan) 4 mg/actuation nasal spray    naloxone (NARCAN) injection 0.04 mg    DISCONTD: buprenorphine-naloxone (SUBOXONE) 8mg-2mg SL film    DISCONTD: buprenorphine-naloxone (SUBOXONE) 8mg-2mg SL film        ED Course:   Over the ED course, the patient developed opiate withdrawal symptoms, so treated with Suboxone. Patient did well.   Patient discharged home    Patient condition at time of disposition: Stable  DISCHARGE NOTE:   Pt has been reexamined. Patient has no new complaints, changes, or physical findings. Care plan outlined and precautions discussed. Results were reviewed with the patient. All medications were reviewed with the patient; will d/c home with Narcan as needed. All of pt's questions and concerns were addressed. Alarm symptoms and return precautions associated with chief complaint and evaluation were reviewed with the patient in detail. The patient demonstrated adequate understanding. Patient was instructed and agrees to follow up with PCP, as well as to return to the ED upon further deterioration. Patient is ready to go home. The patient is happy with this plan    Follow-up Information     Follow up With Specialties Details Why [facility] EMERGENCY DEPT Emergency Medicine  If you should change her mind. I believe you need further evaluation Radha0 JOSEFINA Saucedo  [phone]          Discharge Medication List as of 10/31/2020  1:43 AM              Disposition: Discharge home    Diagnosis     Clinical Impression:   1. Polysubstance (excluding opioids) dependence, daily use (HCC)    2. Opiate overdose, accidental or unintentional, initial encounter ([facility] [address]    3. Acute hypoxemic respiratory failure (HCC)        Signed,  Sylvia Lopes MD  Emergency Physician  [facility]    As a voice dictation software was utilized to dictate this note, minor word transpositions can occur. I apologize for confusing wording and typographic errors. Please feel free to contact me for clarification.

## 2020-10-31 NOTE — ED NOTES
Pt awake at this time. Denies SI/HI, states he has hx of depression but has never had any thoughts of killing himself.  Overdose unintentional.

## 2020-10-31 NOTE — ED NOTES
Pt finished talking with doctor, MD walked away from room to prepare to discharge pt and pt immediately fell asleep, RR at approx 6-8, spO2 85%, minimally responsive to agitation. Will only open eyes to look at stimulus and fall back asleep. MD notified, states will come to bedside.

## 2020-12-28 ENCOUNTER — VIRTUAL VISIT (OUTPATIENT)
Dept: FAMILY MEDICINE CLINIC | Age: 40
End: 2020-12-28
Payer: COMMERCIAL

## 2020-12-28 VITALS — WEIGHT: 200 LBS | HEIGHT: 71 IN | BODY MASS INDEX: 28 KG/M2 | RESPIRATION RATE: 12 BRPM

## 2020-12-28 DIAGNOSIS — F31.62 BIPOLAR DISORDER, CURRENT EPISODE MIXED, MODERATE (HCC): ICD-10-CM

## 2020-12-28 DIAGNOSIS — L30.9 DERMATITIS: Primary | ICD-10-CM

## 2020-12-28 DIAGNOSIS — E66.3 OVERWEIGHT (BMI 25.0-29.9): ICD-10-CM

## 2020-12-28 DIAGNOSIS — B18.2 HEP C W/O COMA, CHRONIC (HCC): ICD-10-CM

## 2020-12-28 PROCEDURE — 99204 OFFICE O/P NEW MOD 45 MIN: CPT | Performed by: INTERNAL MEDICINE

## 2020-12-28 RX ORDER — CIPROFLOXACIN 500 MG/1
TABLET ORAL
COMMUNITY
Start: 2020-11-11

## 2020-12-28 RX ORDER — SULFAMETHOXAZOLE AND TRIMETHOPRIM 800; 160 MG/1; MG/1
TABLET ORAL
Status: CANCELLED | OUTPATIENT
Start: 2020-12-28

## 2020-12-28 RX ORDER — CIPROFLOXACIN 500 MG/1
TABLET ORAL
Status: CANCELLED | OUTPATIENT
Start: 2020-12-28

## 2020-12-28 RX ORDER — SULFAMETHOXAZOLE AND TRIMETHOPRIM 800; 160 MG/1; MG/1
TABLET ORAL
COMMUNITY
Start: 2020-11-11

## 2020-12-28 RX ORDER — BUPRENORPHINE HYDROCHLORIDE, NALOXONE HYDROCHLORIDE 12; 3 MG/1; MG/1
FILM, SOLUBLE BUCCAL; SUBLINGUAL
COMMUNITY
Start: 2020-12-17

## 2020-12-28 NOTE — PROGRESS NOTES
Veena Ward is a 36 y.o. male who was seen by synchronous (real-time) audio-video technology on 12/28/2020. Consent:  He and/or his healthcare decision maker is aware that this patient-initiated Telehealth encounter is a billable service, with coverage as determined by his insurance carrier. He is aware that he may receive a bill and has provided verbal consent to proceed: Yes    I was at home while conducting this encounter. Assessment & Plan:   Diagnoses and all orders for this visit:    1. Dermatitis  -     REFERRAL TO DERMATOLOGY    2. Bipolar disorder, current episode mixed, moderate (HCC)  -     REFERRAL TO PSYCHIATRY    3. Hep C w/o coma, chronic (HCC)  -     CBC WITH AUTOMATED DIFF; Future  -     METABOLIC PANEL, COMPREHENSIVE; Future  -     REFERRAL TO GASTROENTEROLOGY  -     HCV RT-PCR, QUANT (NON-GRAPH); Future    4. Overweight (BMI 25.0-29.9)  -     LIPID PANEL; Future  -     HEMOGLOBIN A1C WITH EAG; Future    HM  Colon cancer: Colonoscopy due at age 48  Dyslipidemia: check fasting lipid panel   Diabetes mellitus: check A1c  Influenza vaccine: due  Pneumococcal vaccine: due  Tdap: unknown  Herpes Zoster vaccine: due at age 61  Hep B vaccine: unknown  Weight:  Body mass index is 27.89 kg/m². Discussed the patient's BMI with him. The BMI follow up plan is as follows: diet and exercise  Prostate cancer:  PSA not indicated  AAA:  One-time abdominal US if current or former smoker aged 72 to 76 years   Osteoporosis:  No indication for dexa scan    Recommend smoking cessation    Follow-up and Dispositions    · Return in about 2 weeks (around 1/11/2021) for Go over labs/imaging, Smoking/Smokeless tobacco/Vaping cessation. Follow-up and Disposition History      712  Subjective:   Veena Ward was seen for Establish Care, Hepatitis C, Nicotine Dependence, Bipolar, and Skin Problem    Dermatitis  This is a chronic problem, new to me. This is not controlled. It is present on his b/l legs and arms.   It has been present for a year. Pt went to [facility] and is being treated for cellulitis. Anxiety/Depression/Insomnia/Adult ADD/Bipolar disorder  This is a chronic problem, new to me. This is not controlled. Pt was on hydroxyzine and adderall. Pt does not have a psychiatrist.     Hepatitis C  This is a chronic problem. This is stable. Pt takes no medication for this. He is on suboxone for previous heroin use. 3 most recent PHQ Screens 12/28/2020   Little interest or pleasure in doing things Several days   Feeling down, depressed, irritable, or hopeless Several days   Total Score PHQ 2 2      Prior to Admission medications    Medication Sig Start Date End Date Taking? Authorizing Provider   ciprofloxacin HCl (CIPRO) 500 mg tablet TAKE 1 TABLET BY MOUTH TWICE A DAY 11/11/20  Yes Provider, Historical   trimethoprim-sulfamethoxazole (BACTRIM DS, SEPTRA DS) 160-800 mg per tablet TAKE 1 TABLET BY MOUTH TWICE A DAY 11/11/20  Yes Provider, Historical   Suboxone 12-3 mg film sublingual film DISSOLVE 1 FILM UNDER TONGUE DAILY 12/17/20  Yes Provider, Historical   furosemide (Lasix) 20 mg tablet Take 1 Tab by mouth daily for 20 days. 3/2/21 3/22/21  Cloyde Olives, DO   hydrocortisone 2 % lotion Apply  to affected area two (2) times a day.  3/2/21   Cloyde Olives, DO     Allergies   Allergen Reactions    Pcn [Penicillins] Angioedema     Any type of cillin     ROS  General:   fevers, chills  Neurologic: dizziness, lightheadedness  Eyes:  vision changes, double vision, photophobia  Ears:  change in hearing, ear pain, ear discharge, ear ringing  Nose:  sneezing, +runny nose (allergic to dust, temp change)  Mouth/Throat: sore throat, voice change  Neck:  pain, stiffness  Respiratory: dyspnea at rest, dyspnea on exertion, wheezing, cough, sputum production  Cardiovascular:   chest pain, palpitations  Gastrointestinal:  nausea, vomiting  Urinary: dysuria, urinary frequency, nocturia, malodorous urine, difficulty initiating flow, slow urine stream  Genital (M): penile discharge, ulcerations  Musculoskeletal:  joint pain, back pain  Psychiatric: +insomnia, +anxiety  Endocrine: cold intolerance, +heat intolerance  Hematologic: easy bruising, easy bleeding  Dermatologic: Itching, +rash     PHYSICAL EXAMINATION:  [ INSTRUCTIONS:  \"[x]\" Indicates a positive item  \"[]\" Indicates a negative item  -- DELETE ALL ITEMS NOT EXAMINED]  Vital Signs: (As obtained by patient/caregiver at home)  Visit Vitals  Resp 12   Ht 5' 11\" (1.803 m)   Wt 200 lb (90.7 kg)   BMI 27.89 kg/m²      Constitutional: [x] Appears well-developed and well-nourished [x] No apparent distress    Mental status: [x] Alert and awake  [x] Oriented to person/place/time [x] Able to follow commands    Eyes:   EOM    [x]  Normal  Sclera  [x]  Normal Discharge [x]  None visible   []   HENT: [x] Normocephalic, atraumatic [x] Mouth/Throat: Mucous membranes are moist  External Ears [x] Normal   Neck: [x] No visualized mass  Pulmonary/Chest: [x] Respiratory effort normal   [x] No visualized signs of difficulty breathing or respiratory distress  Musculoskeletal:   [x] Normal gait with no signs of ataxia  [x] Normal range of motion of neck  Neurological:  [x] No Facial Asymmetry [x] No gaze palsy    Skin:        [x] No significant exanthematous lesions or discoloration noted on facial skin           Psychiatric:       [x] Normal Affect     [x] No Hallucinations     We discussed the expected course, resolution and complications of the diagnosis(es) in detail. Medication risks, benefits, costs, interactions, and alternatives were discussed as indicated. I advised him to contact the office if his condition worsens, changes or fails to improve as anticipated. He expressed understanding with the diagnosis(es) and plan.      Pursuant to the emergency declaration under the [address] and Response Supplemental Appropriations Act, this Virtual  Visit was conducted, with patient's consent, to reduce the patient's risk of exposure to COVID-19 and provide continuity of care for an established patient. Services were provided through a video synchronous discussion virtually to substitute for in-person clinic visit.     Emily Singh MD  Internal Medicine  3/12/2021, 4:06 PM  [facility]  [address] [place] [address] [place], [address]  Phone [phone]  Fax [phone]

## 2020-12-28 NOTE — PATIENT INSTRUCTIONS
Bipolar Disorder: Care Instructions Your Care Instructions Bipolar disorder is an illness that causes extreme mood changes, from times of very high energy (manic episodes) to times of depression. But many people with bipolar disorder show only the symptoms of depression. These moods may cause problems with your work, school, family life, friendships, and how well you function. This disease is also called manic-depression. There is no cure for bipolar disorder, but it can be helped with medicines. Counseling may also help. It is important to take your medicines exactly as prescribed, even when you feel well. You may need lifelong treatment. Follow-up care is a key part of your treatment and safety. Be sure to make and go to all appointments, and call your doctor if you are having problems. It's also a good idea to know your test results and keep a list of the medicines you take. How can you care for yourself at home? · Be safe with medicines. Take your medicines exactly as prescribed. Do not stop or change a medicine without talking to your doctor first. Unknown Ceasar and your doctor may need to try different combinations of medicines to find what works for you. · Take your medicines on schedule to keep your moods even. When you feel good, you may think that you do not need your medicines. But it is important to keep taking them. · Go to your counseling sessions. Call and talk with your counselor if you can't go to a session or if you don't think the sessions are helping. Do not just stop going. · Get at least 30 minutes of activity on most days of the week. Walking is a good choice. You also may want to do other things, such as running, swimming, or cycling. · Get enough sleep. Keep your room dark and quiet. Try to go to bed at the same time every night. · Eat a healthy diet. This includes whole grains, dairy, fruits, vegetables, and protein. Eat foods from each of these groups. · Try to lower your stress. Manage your time, build a strong support system, and lead a healthy lifestyle. To lower your stress, try physical activity, slow deep breathing, or getting a massage. · Do not use alcohol, marijuana, or illegal drugs. · Learn the early signs of your mood changes. You can then take steps to help yourself feel better. · Ask for help from friends and family when you need it. You may need help with daily chores when you are depressed. When you are manic, you may need support to control your high energy levels. What should you do if someone in your family has bipolar disorder? · Learn about the disease and signs it's getting worse. · Remind your family member you love them. · Make a plan with all family members about how to take care of your loved one when symptoms are bad. · Remind yourself it will take time for changes to occur. · Try not to blame yourself for the disease. · Know your legal rights and the legal rights of your family member. Support groups or counselors can help with this information. · Take care of yourself. Keep up with your interests, such as career, hobbies, and friends. Use exercise, positive self-talk, deep breathing, and other relaxing exercises to help lower your stress. · Give yourself time to grieve. You may need to deal with emotions such as anger, fear, and frustration. · If you are having a hard time with your feelings or with your relationship with your family member, talk with a counselor. When should you call for help? Call 911 anytime you think you may need emergency care. For example, call if: 
  · You feel like hurting yourself or someone else.  
  · Someone who has bipolar disorder displays dangerous behavior, and you think the person might hurt himself or herself or someone else. Call your doctor now or seek immediate medical care if: 
  · You hear voices.   · Someone you know has bipolar disorder and talks about suicide. Keep the numbers for these national suicide hotlines: 5-898-119-TALK (7-355.262.3792) and 6-826-SHMYQRB (0-801.617.2217). If a suicide threat seems real, with a specific plan and a way to carry it out, stay with the person, or ask someone you trust to stay with the person, until you can get help.  
  · Someone you know has bipolar disorder and: 
? Starts to give away possessions. ? Is using illegal drugs or drinking alcohol heavily. ? Talks or writes about death, including writing suicide notes or talking about guns, knives, or pills. ? Talks or writes about hurting someone else. ? Starts to spend a lot of time alone. ? Acts very aggressively or suddenly appears calm. ? Talks about beliefs that are not based in reality (delusions). Watch closely for changes in your health, and be sure to contact your doctor if: 
  · You cannot go to your counseling sessions. Where can you learn more? Go to http://www.sheppard.com/ Enter K052 in the search box to learn more about \"Bipolar Disorder: Care Instructions. \" Current as of: January 31, 2020               Content Version: 12.6 © 5251-0306 [employer]. Care instructions adapted under license by [employer] (which disclaims liability or warranty for this information). If you have questions about a medical condition or this instruction, always ask your healthcare professional. [place] [address] any warranty or liability for your use of this information.

## 2020-12-28 NOTE — PROGRESS NOTES
Van Cota is a 36 y.o.  male presents today for office visit for establish care. Pt would also like to discuss med refill. 1. Have you been to the ER, urgent care clinic since your last visit? Hospitalized since your last visit? No    2. Have you seen or consulted any other health care providers outside of the [address] [place] [address] since your last visit? Include any pap smears or colon screening. No    Upcoming Appts  none    Health Maintenance reviewed     VORB: No orders of the defined types were placed in this encounter.   Augie Frausto LPN

## 2021-01-04 ENCOUNTER — TELEPHONE (OUTPATIENT)
Dept: FAMILY MEDICINE CLINIC | Age: 41
End: 2021-01-04

## 2021-03-01 ENCOUNTER — HOSPITAL ENCOUNTER (EMERGENCY)
Age: 41
Discharge: HOME | End: 2021-03-02
Attending: EMERGENCY MEDICINE
Payer: COMMERCIAL

## 2021-03-01 DIAGNOSIS — R60.9 PERIPHERAL EDEMA: ICD-10-CM

## 2021-03-01 DIAGNOSIS — L30.8 OTHER ECZEMA: Primary | ICD-10-CM

## 2021-03-01 LAB
ALBUMIN SERPL-MCNC: 3.5 G/DL (ref 3.4–5)
ALBUMIN/GLOB SERPL: 0.7 {RATIO} (ref 0.8–1.7)
ALP SERPL-CCNC: 108 U/L (ref 45–117)
ALT SERPL-CCNC: 53 U/L (ref 16–61)
ANION GAP SERPL CALC-SCNC: 5 MMOL/L (ref 3–18)
AST SERPL-CCNC: 43 U/L (ref 10–38)
BASOPHILS # BLD: 0.1 K/UL (ref 0–0.1)
BASOPHILS NFR BLD: 1 % (ref 0–2)
BILIRUB SERPL-MCNC: 0.3 MG/DL (ref 0.2–1)
BNP SERPL-MCNC: 91 PG/ML (ref 0–450)
BUN SERPL-MCNC: 16 MG/DL (ref 7–18)
BUN/CREAT SERPL: 15 (ref 12–20)
CALCIUM SERPL-MCNC: 8.8 MG/DL (ref 8.5–10.1)
CHLORIDE SERPL-SCNC: 104 MMOL/L (ref 100–111)
CO2 SERPL-SCNC: 30 MMOL/L (ref 21–32)
CREAT SERPL-MCNC: 1.04 MG/DL (ref 0.6–1.3)
DIFFERENTIAL METHOD BLD: ABNORMAL
EOSINOPHIL # BLD: 0.2 K/UL (ref 0–0.4)
EOSINOPHIL NFR BLD: 2 % (ref 0–5)
ERYTHROCYTE [DISTWIDTH] IN BLOOD BY AUTOMATED COUNT: 14.8 % (ref 11.6–14.5)
GLOBULIN SER CALC-MCNC: 4.7 G/DL (ref 2–4)
GLUCOSE SERPL-MCNC: 91 MG/DL (ref 74–99)
HCT VFR BLD AUTO: 42.8 % (ref 36–48)
HGB BLD-MCNC: 13.1 G/DL (ref 13–16)
LACTATE SERPL-SCNC: 0.9 MMOL/L (ref 0.4–2)
LYMPHOCYTES # BLD: 1.5 K/UL (ref 0.9–3.6)
LYMPHOCYTES NFR BLD: 16 % (ref 21–52)
MCH RBC QN AUTO: 26.5 PG (ref 24–34)
MCHC RBC AUTO-ENTMCNC: 30.6 G/DL (ref 31–37)
MCV RBC AUTO: 86.5 FL (ref 74–97)
MONOCYTES # BLD: 1.1 K/UL (ref 0.05–1.2)
MONOCYTES NFR BLD: 11 % (ref 3–10)
NEUTS SEG # BLD: 7.1 K/UL (ref 1.8–8)
NEUTS SEG NFR BLD: 70 % (ref 40–73)
PLATELET # BLD AUTO: 376 K/UL (ref 135–420)
PMV BLD AUTO: 9.1 FL (ref 9.2–11.8)
POTASSIUM SERPL-SCNC: 3.7 MMOL/L (ref 3.5–5.5)
PROT SERPL-MCNC: 8.2 G/DL (ref 6.4–8.2)
RBC # BLD AUTO: 4.95 M/UL (ref 4.7–5.5)
SODIUM SERPL-SCNC: 139 MMOL/L (ref 136–145)
WBC # BLD AUTO: 9.9 K/UL (ref 4.6–13.2)

## 2021-03-01 PROCEDURE — 80053 COMPREHEN METABOLIC PANEL: CPT

## 2021-03-01 PROCEDURE — 85025 COMPLETE CBC W/AUTO DIFF WBC: CPT

## 2021-03-01 PROCEDURE — 99284 EMERGENCY DEPT VISIT MOD MDM: CPT

## 2021-03-01 PROCEDURE — 83880 ASSAY OF NATRIURETIC PEPTIDE: CPT

## 2021-03-01 PROCEDURE — 83605 ASSAY OF LACTIC ACID: CPT

## 2021-03-02 VITALS
HEART RATE: 82 BPM | OXYGEN SATURATION: 100 % | HEIGHT: 71 IN | TEMPERATURE: 98 F | WEIGHT: 200 LBS | DIASTOLIC BLOOD PRESSURE: 85 MMHG | BODY MASS INDEX: 28 KG/M2 | RESPIRATION RATE: 23 BRPM | SYSTOLIC BLOOD PRESSURE: 133 MMHG

## 2021-03-02 RX ORDER — FUROSEMIDE 20 MG/1
20 TABLET ORAL DAILY
Qty: 20 TAB | Refills: 0 | Status: SHIPPED | OUTPATIENT
Start: 2021-03-02 | End: 2021-03-22

## 2021-03-02 NOTE — ED PROVIDER NOTES
EMERGENCY DEPARTMENT HISTORY AND PHYSICAL EXAM    10:47 PM      Date: 3/1/2021  Patient Name: Elen Pacheco    History of Presenting Illness     Chief Complaint   Patient presents with    Leg Swelling         History Provided By: Patient    Chief Complaint: leg swelling, sores  Duration:  Years  Timing:  Constant  Location: BL lower legs  Quality: Aching and Tightness  Severity: Moderate  Modifying Factors: none  Associated Symptoms: denies any other associated signs or symptoms      Additional History (Context): Elen Pacheco is a 36 y.o. male with Polysubstance abuse, hep C who presents with leg swelling and sores. Has been ongoing for about 2 years. Reports he has seen a dermatologist in the past was told he had eczema. Is not on any daily medications. Reports swelling is actually better than has been in the past.  Denies any shortness of breath. No history of DVT or PE. Reports having more wounds that seem to be oozing. No fever. No vomiting. No recent surgery or travel. No other complaints. Social: Occasional tobacco, denies EtOH, Suboxone use    PCP: Preston Gabriel MD    Current Outpatient Medications   Medication Sig Dispense Refill    furosemide (Lasix) 20 mg tablet Take 1 Tab by mouth daily for 20 days. 20 Tab 0    hydrocortisone 2 % lotion Apply  to affected area two (2) times a day.  1 Bottle 0    ciprofloxacin HCl (CIPRO) 500 mg tablet TAKE 1 TABLET BY MOUTH TWICE A DAY      trimethoprim-sulfamethoxazole (BACTRIM DS, SEPTRA DS) 160-800 mg per tablet TAKE 1 TABLET BY MOUTH TWICE A DAY      Suboxone 12-3 mg film sublingual film DISSOLVE 1 FILM UNDER TONGUE DAILY         Past History     Past Medical History:  Past Medical History:   Diagnosis Date    Anxiety     Bipolar disorder, current episode mixed, moderate ([address] 12/28/2020    Chronic edema     BLE    Chronic ulcer of right leg (HCC)     Chronic RLE Wound    Dermatitis 12/28/2020    Hep C w/o coma, chronic ([address] 12/28/2020    History of impetigo     Left against medical advice 02/12/2021    Polysubstance abuse ([facility] 10/27/2019    Methamphetamine; (4-6 caps, snorting) Heroin (2019) with IV Heroin Use several years prior (2012?)    Scabies 2012    Tobacco abuse        Past Surgical History:  Past Surgical History:   Procedure Laterality Date    HX OTHER SURGICAL Right 07/29/2014    Laceration Repair of Right Eyebrow & Right Eyelid    IR DRAIN CUTANEOUS/SUBCU ABSCESS Left 12/18/2011    Left Hand (No IR)    IR DRAIN CUTANEOUS/SUBCU ABSCESS Bilateral 11/13/2012    BUE and Right Hand I&D thought 2/2 to Scabies       Family History:  No family history on file. Social History:  Social History     Tobacco Use    Smoking status: Light Tobacco Smoker     Packs/day: 0.50    Smokeless tobacco: Never Used    Tobacco comment: 0.25-0.5 PPD x6 years (since at least 2015)   Substance Use Topics    Alcohol use: Not Currently    Drug use: Yes     Types: Heroin, Methamphetamines, Prescription, Marijuana, IV     Comment: marijuana currently (UDS+ 10/26/2019); Previously Methamphetamines (UDS+ 10/26/2019; Last Use 10/30/2020?); Previously Heroin (Last Use 10/30/2020?) with Previous IV route Use (~2012?)       Allergies: Allergies   Allergen Reactions    Pcn [Penicillins] Angioedema     Any type of cillin         Review of Systems       Review of Systems   Constitutional: Negative for fever. Respiratory: Negative for shortness of breath. Cardiovascular: Positive for leg swelling. Negative for chest pain and palpitations. Gastrointestinal: Negative for abdominal pain and vomiting. Skin: Positive for color change and wound. All other systems reviewed and are negative. Physical Exam     Visit Vitals  /85   Pulse 82   Temp 98 °F (36.7 °C)   Resp 23   Ht 5' 11\" (1.803 m)   Wt 90.7 kg (200 lb)   SpO2 100%   BMI 27.89 kg/m²       Physical Exam  Constitutional:       Appearance: He is well-developed.    HENT:      Head: Normocephalic and atraumatic. Neck:      Musculoskeletal: Neck supple. Vascular: No JVD. Cardiovascular:      Rate and Rhythm: Normal rate and regular rhythm. Pulmonary:      Effort: Pulmonary effort is normal. No respiratory distress. Breath sounds: Normal breath sounds. Abdominal:      General: There is no distension. Palpations: Abdomen is soft. Tenderness: There is no abdominal tenderness. There is no guarding or rebound. Musculoskeletal:      Right lower leg: Edema present. Left lower leg: Edema present. Comments: No joint tenderness  2+ lower extremity edema  DP is 2+ bilaterally   Skin:     General: Skin is warm and dry. Findings: Erythema present. Comments: Bilateral lower extremities with anterior erythema with ulcerations noted, no purulent drainage, no foul smell, mild warmth, circumferential erythema,  Patchy excoriated areas, consistent with eczema   Neurological:      Mental Status: He is alert and oriented to person, place, and time. Psychiatric:         Judgment: Judgment normal.           Diagnostic Study Results     Labs -  Recent Results (from the past 12 hour(s))   CBC WITH AUTOMATED DIFF    Collection Time: 03/01/21 11:10 PM   Result Value Ref Range    WBC 9.9 4.6 - 13.2 K/uL    RBC 4.95 4.70 - 5.50 M/uL    HGB 13.1 13.0 - 16.0 g/dL    HCT 42.8 36.0 - 48.0 %    MCV 86.5 74.0 - 97.0 FL    MCH 26.5 24.0 - 34.0 PG    MCHC 30.6 (L) 31.0 - 37.0 g/dL    RDW 14.8 (H) 11.6 - 14.5 %    PLATELET 122 064 - 156 K/uL    MPV 9.1 (L) 9.2 - 11.8 FL    NEUTROPHILS 70 40 - 73 %    LYMPHOCYTES 16 (L) 21 - 52 %    MONOCYTES 11 (H) 3 - 10 %    EOSINOPHILS 2 0 - 5 %    BASOPHILS 1 0 - 2 %    ABS. NEUTROPHILS 7.1 1.8 - 8.0 K/UL    ABS. LYMPHOCYTES 1.5 0.9 - 3.6 K/UL    ABS. MONOCYTES 1.1 0.05 - 1.2 K/UL    ABS. EOSINOPHILS 0.2 0.0 - 0.4 K/UL    ABS.  BASOPHILS 0.1 0.0 - 0.1 K/UL    DF AUTOMATED     METABOLIC PANEL, COMPREHENSIVE    Collection Time: 03/01/21 11:10 PM Result Value Ref Range    Sodium 139 136 - 145 mmol/L    Potassium 3.7 3.5 - 5.5 mmol/L    Chloride 104 100 - 111 mmol/L    CO2 30 21 - 32 mmol/L    Anion gap 5 3.0 - 18 mmol/L    Glucose 91 74 - 99 mg/dL    BUN 16 7.0 - 18 MG/DL    Creatinine 1.04 0.6 - 1.3 MG/DL    BUN/Creatinine ratio 15 12 - 20      GFR est AA >60 >60 ml/min/1.73m2    GFR est non-AA >60 >60 ml/min/1.73m2    Calcium 8.8 8.5 - 10.1 MG/DL    Bilirubin, total 0.3 0.2 - 1.0 MG/DL    ALT (SGPT) 53 16 - 61 U/L    AST (SGOT) 43 (H) 10 - 38 U/L    Alk. phosphatase 108 45 - 117 U/L    Protein, total 8.2 6.4 - 8.2 g/dL    Albumin 3.5 3.4 - 5.0 g/dL    Globulin 4.7 (H) 2.0 - 4.0 g/dL    A-G Ratio 0.7 (L) 0.8 - 1.7     NT-PRO BNP    Collection Time: 03/01/21 11:10 PM   Result Value Ref Range    NT pro-BNP 91 0 - 450 PG/ML   LACTIC ACID    Collection Time: 03/01/21 11:10 PM   Result Value Ref Range    Lactic acid 0.9 0.4 - 2.0 MMOL/L       Radiologic Studies -   No orders to display         Medical Decision Making   I am the first provider for this patient. I reviewed the vital signs, available nursing notes, past medical history, past surgical history, family history and social history. Vital Signs-Reviewed the patient's vital signs. Pulse Oximetry Analysis -  100 on room air (Interpretation)nl       Records Reviewed: Nursing Notes and Old Medical Records (Time of Review: 10:47 PM)    ED Course: Progress Notes, Reevaluation, and Consults:      Provider Notes (Medical Decision Making): 80-year-old male presenting with bilateral leg swelling and wounds. He has signs of eczema on his bilateral legs with overlying erythema and some open wounds. Will screen for infection with lactate basic labs. Seems unlikely cellulitis though due to duration of symptoms. Not consistent with neck fashion. Also has some bilateral peripheral edema, suspect this due to venous stasis with screen for CHF with BNP. Discussed rules with patient.   His labs are reassuring. Again do not see signs of cellulitis will start on topical steroid and give a course of Lasix. Have discussed with patient may be wound care follow-up although he has been referred to [facility] by his PCP. No acute medical condition at this time. Is stable for CA home. Diagnosis     Clinical Impression:   1. Other eczema    2. Peripheral edema        Disposition: discharged    Follow-up Information     Follow up With Specialties Details Why Contact Info    Nora Roy MD Internal Medicine Schedule an appointment as soon as possible for a visit in 3 days  [facility] [id number]  [phone]      [address] Schedule an appointment as soon as possible for a visit   [address]  [address] [facility]  [phone]    [facility] EMERGENCY DEPT Emergency Medicine  As needed, If symptoms worsen [address]  [phone]           Discharge Medication List as of 3/2/2021 12:01 AM      START taking these medications    Details   furosemide (Lasix) 20 mg tablet Take 1 Tab by mouth daily for 20 days. , Print, Disp-20 Tab, R-0      hydrocortisone 2 % lotion Apply  to affected area two (2) times a day., Print, Disp-1 Bottle, R-0         CONTINUE these medications which have NOT CHANGED    Details   ciprofloxacin HCl (CIPRO) 500 mg tablet TAKE 1 TABLET BY MOUTH TWICE A DAY, Historical Med      trimethoprim-sulfamethoxazole (BACTRIM DS, SEPTRA DS) 160-800 mg per tablet TAKE 1 TABLET BY MOUTH TWICE A DAY, Historical Med      Suboxone 12-3 mg film sublingual film DISSOLVE 1 FILM UNDER TONGUE DAILY, Historical Med, MEAGHAN           _______________________________

## 2021-03-11 ENCOUNTER — TELEPHONE (OUTPATIENT)
Dept: FAMILY MEDICINE CLINIC | Age: 41
End: 2021-03-11

## 2021-03-11 NOTE — TELEPHONE ENCOUNTER
Future Appointments   Date Time Provider Edison Holder   3/15/2021  3:30 PM Ajit Harden MD [facility]

## 2021-03-12 ENCOUNTER — TELEPHONE (OUTPATIENT)
Dept: FAMILY MEDICINE CLINIC | Age: 41
End: 2021-03-12

## 2021-03-12 NOTE — TELEPHONE ENCOUNTER
Sissy was referred to GI. However, they cannot schedule the patient until we provide labs showing that the patient has Hep C.

## 2021-03-15 ENCOUNTER — VIRTUAL VISIT (OUTPATIENT)
Dept: FAMILY MEDICINE CLINIC | Age: 41
End: 2021-03-15

## 2021-03-16 ENCOUNTER — VIRTUAL VISIT (OUTPATIENT)
Dept: FAMILY MEDICINE CLINIC | Age: 41
End: 2021-03-16

## 2021-03-16 VITALS — BODY MASS INDEX: 28 KG/M2 | WEIGHT: 200 LBS | HEIGHT: 71 IN

## 2021-03-16 NOTE — PROGRESS NOTES
Yaritza Martinez is a 36 y.o.  male presents today for office visit for follow up. Pt would also like to discuss leg swelling. 1. Have you been to the ER, urgent care clinic since your last visit? Hospitalized since your last visit? No    2. Have you seen or consulted any other health care providers outside of the [address] [place] [address] since your last visit? Include any pap smears or colon screening. No    Upcoming Appts  none    Health Maintenance reviewed    VORB: No orders of the defined types were placed in this encounter.   Jaimie Mendoza LPN

## 2021-03-24 ENCOUNTER — TELEPHONE (OUTPATIENT)
Dept: FAMILY MEDICINE CLINIC | Age: 41
End: 2021-03-24

## 2022-01-31 ENCOUNTER — OFFICE VISIT (OUTPATIENT)
Dept: VASCULAR SURGERY | Age: 42
End: 2022-01-31
Payer: COMMERCIAL

## 2022-01-31 VITALS
DIASTOLIC BLOOD PRESSURE: 120 MMHG | WEIGHT: 199.96 LBS | HEART RATE: 80 BPM | SYSTOLIC BLOOD PRESSURE: 150 MMHG | OXYGEN SATURATION: 98 % | BODY MASS INDEX: 27.99 KG/M2 | HEIGHT: 71 IN

## 2022-01-31 DIAGNOSIS — I89.0 LYMPHEDEMA: Primary | ICD-10-CM

## 2022-01-31 DIAGNOSIS — Z87.2 HISTORY OF ULCER OF LOWER EXTREMITY: ICD-10-CM

## 2022-01-31 PROCEDURE — 99203 OFFICE O/P NEW LOW 30 MIN: CPT | Performed by: NURSE PRACTITIONER

## 2022-01-31 NOTE — PROGRESS NOTES
1. Have you been to an emergency room or urgent care clinic since your last visit? No    Hospitalized since your last visit? If yes, where, when, and reason for visit? No  2. Have you seen or consulted any other health care providers outside of the [facility] since your last visit including any procedures, health maintenance items. If yes, where, when and reason for visit?  No         Lymphedema Leg Measurements are as Followed    Left Ankle 25 cm  Right Ankle 25 cm    Left Calf 45.5 cm   Right Calf 45 cm    Left Knee 42.5 cm  Right Knee 42 cm     Left Thigh 52.5 cm   Right Thigh 53 cm

## 2022-01-31 NOTE — PROGRESS NOTES
Chief Complaint   Patient presents with    New Patient    Swelling         Impression and Plan:  39 y.o. male with lymphedema. He has no CHF, kidney disease and his hepatitis C appears stable due to his lab work. He most likely has developed lymphedema secondary to his IV drug use. Refer to [facility] in [facility] as there is no wait list and they are willing to drive. Refer to [facility]ile for lymphedema pump  Refer to [facility] for lymphedema garments. Moisturize legs with Aquaphor. History and Physical    David Amaral is a 39y.o. year old male with a history o bipolar disorder,f polysubstance abuse, hep C and Suboxone usehere as an oncoming referral for BLE edema. His BMI is 27.8. The patient states that he has been dealing with bilateral lower extremity edema for the last 2 years. He stated that began in his arms and then resolved. And now it has been normal in his legs. He does have a history of drug abuse with injection. He did endorse injections both in the bilateral upper and lower extremity. He denies a family history of lymphedema. He does have a history of a chronic right leg ulcer which is healed. There are some weakened areas of concern on the left calf. He states when he is in the shower that he will scrub his legs to take off any dead skin. We discussed the importance of him leaving things covered and moisturizing his skin appropriately. He has been compliant with 20 mmHg to 30 mmHg compression, elevation and maintaining an appropriate BMI. His last metabolic panel 5/4/7188 was normal with exception of a slightly elevated glucose level at 115.   A BNP was taken and also normal.    Past Medical History:   Diagnosis Date    Anxiety     Bipolar disorder, current episode mixed, moderate ([address] 12/28/2020    Chronic edema     BLE    Chronic ulcer of right leg (HCC)     Chronic RLE Wound    Dermatitis 12/28/2020    Hep C w/o coma, chronic ([address] 12/28/2020    History of impetigo     Left against medical advice 02/12/2021    Polysubstance abuse ([facility] 10/27/2019    Methamphetamine; (4-6 caps, snorting) Heroin (2019) with IV Heroin Use several years prior (2012?)    Scabies 2012    Tobacco abuse      Patient Active Problem List   Diagnosis Code    Hep C w/o coma, chronic (HCC) B18.2    Dermatitis L30.9    Bipolar disorder, current episode mixed, moderate ([facility] F31.62     Past Surgical History:   Procedure Laterality Date    HX OTHER SURGICAL Right 07/29/2014    Laceration Repair of Right Eyebrow & Right Eyelid    IR DRAIN CUTANEOUS/SUBCU ABSCESS Left 12/18/2011    Left Hand (No IR)    IR DRAIN CUTANEOUS/SUBCU ABSCESS Bilateral 11/13/2012    BUE and Right Hand I&D thought 2/2 to Scabies     Current Outpatient Medications   Medication Sig Dispense Refill    hydrocortisone 2 % lotion Apply  to affected area two (2) times a day. 1 Bottle 0    ciprofloxacin HCl (CIPRO) 500 mg tablet TAKE 1 TABLET BY MOUTH TWICE A DAY      trimethoprim-sulfamethoxazole (BACTRIM DS, SEPTRA DS) 160-800 mg per tablet TAKE 1 TABLET BY MOUTH TWICE A DAY      Suboxone 12-3 mg film sublingual film DISSOLVE 1 FILM UNDER TONGUE DAILY       Allergies   Allergen Reactions    Pcn [Penicillins] Angioedema     Any type of cillin     Social History     Socioeconomic History    Marital status: SINGLE     Spouse name: Not on file    Number of children: Not on file    Years of education: Not on file    Highest education level: Not on file   Occupational History    Not on file   Tobacco Use    Smoking status: Light Tobacco Smoker     Packs/day: 0.50    Smokeless tobacco: Never Used    Tobacco comment: 0.25-0.5 PPD x6 years (since at least 2015)   Vaping Use    Vaping Use: Not on file   Substance and Sexual Activity    Alcohol use: Not Currently    Drug use: Yes     Types: Heroin, Methamphetamines, Prescription, Marijuana, IV     Comment: marijuana currently (UDS+ 10/26/2019);  Previously Methamphetamines (UDS+ 10/26/2019; Last Use 10/30/2020?); Previously Heroin (Last Use 10/30/2020?) with Previous IV route Use (~2012?)    Sexual activity: Yes   Other Topics Concern     Service Not Asked    Blood Transfusions Not Asked    Caffeine Concern Not Asked    Occupational Exposure Not Asked    Hobby Hazards Not Asked    Sleep Concern Not Asked    Stress Concern Not Asked    Weight Concern Not Asked    Special Diet Not Asked    Back Care Not Asked    Exercise Not Asked    Bike Helmet Not Asked   [address] Not Asked    Self-Exams Not Asked   Social History Narrative    Not on file     Social Determinants of Health     Financial Resource Strain:     Difficulty of Paying Living Expenses: Not on file   Food Insecurity:     Worried About Running Out of Food in the Last Year: Not on file    Aline of Food in the Last Year: Not on file   Transportation Needs:     Lack of Transportation (Medical): Not on file    Lack of Transportation (Non-Medical): Not on file   Physical Activity:     Days of Exercise per Week: Not on file    Minutes of Exercise per Session: Not on file   Stress:     Feeling of Stress : Not on file   Social Connections:     Frequency of Communication with Friends and Family: Not on file    Frequency of Social Gatherings with Friends and Family: Not on file    Attends Shinto Services: Not on file    Active Member of [address] [place] [address] or Organizations: Not on file    Attends Club or Organization Meetings: Not on file    Marital Status: Not on file   Intimate Partner Violence:     Fear of Current or Ex-Partner: Not on file    Emotionally Abused: Not on file    Physically Abused: Not on file    Sexually Abused: Not on file   Housing Stability:     Unable to Pay for Housing in the Last Year: Not on file    Number of [place] in the Last Year: Not on file    Unstable Housing in the Last Year: Not on file      No family history on file.     Review of Systems General: negative for fever   Eyes: negative for vision loss   HENT: negative for cold symptoms   Respiratory negative for shortness of breath   Cardiac: negative for chest pain   Vascular negative for foot pain at night    Gastrointestinal: negative for abdominal pain   Genitourinary: negative for dysuria    Endocrine: negative for excessive thirst   Skin: negative for rash   Neurological: negative for paralysis   Psychiatric: negative for depression          Physical Exam:    Visit Vitals  Ht 5' 11\" (1.803 m)   Wt 199 lb 15.3 oz (90.7 kg)   BMI 27.89 kg/m²      Constitutional:  Patient is well developed, well nourished, and not distressed. HEENT: atraumatic, normocephalic, wearing a mask. Eyes:   Cunjunctivae clear, no scleral icterus  Neck:   No JVD present. Cardiovascular:  Normal rate, regular rhythm, normal heart sounds. No murmur heard. Pulmonary/Chest: Effort normal .  Extremities: Normal range of motion. Neurological:  he  is alert and oriented x3 . Gait normal. Motor & sensory grossly intact in all 4 limbs. Psych: Appropriate mood and affect. Skin:  Skin is warm and dry. No ulcerations. COMPRESSION PUMP EVALUATION FORM                                                                                                                                                                                                                          Patient name: Cristhian Douglas  : 1980  PRIMARY INSURANCE  [x] Commercial or [] Medicare/MCare  (ALL SECTIONS MUST HAVE BE ANSWERED)    Dx Code   [x] I89.0: Lymphedema AND secondary to CA  [] Q82.0 (tarda): Late onset lymphedema         Conservative Treatment  Has the pt exercised and elevated for >4 weeks without improvement? [x] Yes [] No   Has the pt worn compression of at least 20-30 mmHg (or bandaging) for >4 weeks without improvement?  [x] Yes [] No   Additional notes:     What key term/terms of severity describes this pt in any capacity (i.e. mild, moderate, etc)? [] Hyperplasia  enlarged tissues             [x] Hyperkeratosis  abnormal skin thickening  [x] Hyperpigmentation  discolored patch(es) of skin            [] Lymphorrhea  visible skin weeping  [] Papilloma  wart-like growths                                [] Elephantiasis  severe enlargement & lymphatic obstruction  [] Fibrosis  thickening and scarring of connective tissue (ACCEPTABLE FOR UPPER EXT MEDICARE, NOT LOWER)    Lymphedema stage  [x] Stage 2  Moderate. Fluid accumulation with fluctuating skin changes. Pitting edema may present. [] Stage 3  Severe. Acute swelling (at exam) with trophic skin changes. Non-pitting edema may present     Is there suspected abdominal swelling/fullness due to obesity, trauma, surgical history, pt complaint? [x] Yes[] No  Additional comments:     Has the patient tried and failed 1 time trial of basic pump, resulting in truncal swelling? [x] Yes[] No  Has the patient used basic pneumatic pump for at least 4 weeks daily? [x] Yes[] No    **ADD ALL INFORMATION TO MEDICAL RECORD FOR PAYER REQUIREMENTS**            Clinician signature: Galindo Navarro NP    Date of evaluation: 1/31/2022        The treatment plan was reviewed with the patient in detail. The patient voiced understanding of this plan and all questions and concerns were addressed. The patient agrees with this plan. We discussed the signs and symptoms that would require earlier attention or intervention. I appreciate the opportunity to participate in the care of your patient. I will be sure to keep you informed of any subsequent changes in the treatment plan. If you have any questions or concerns, please feel free to contact me.       Galindo Navarro [facility]  Vascular Nurse [address]  [phone]

## 2022-03-19 PROBLEM — L30.9 DERMATITIS: Status: ACTIVE | Noted: 2020-12-28

## 2022-03-19 PROBLEM — B18.2 HEP C W/O COMA, CHRONIC (HCC): Status: ACTIVE | Noted: 2020-12-28

## 2022-03-20 PROBLEM — F31.62 BIPOLAR DISORDER, CURRENT EPISODE MIXED, MODERATE (HCC): Status: ACTIVE | Noted: 2020-12-28

## 2022-04-27 ENCOUNTER — OFFICE VISIT (OUTPATIENT)
Dept: VASCULAR SURGERY | Age: 42
End: 2022-04-27
Payer: COMMERCIAL

## 2022-04-27 VITALS
DIASTOLIC BLOOD PRESSURE: 86 MMHG | OXYGEN SATURATION: 98 % | BODY MASS INDEX: 27.99 KG/M2 | SYSTOLIC BLOOD PRESSURE: 138 MMHG | HEIGHT: 71 IN | HEART RATE: 73 BPM | WEIGHT: 199.96 LBS

## 2022-04-27 DIAGNOSIS — Z87.2 HISTORY OF ULCER OF LOWER EXTREMITY: ICD-10-CM

## 2022-04-27 DIAGNOSIS — I89.0 LYMPHEDEMA: Primary | ICD-10-CM

## 2022-04-27 PROCEDURE — 99213 OFFICE O/P EST LOW 20 MIN: CPT | Performed by: NURSE PRACTITIONER

## 2022-04-27 RX ORDER — VELPATASVIR AND SOFOSBUVIR 100; 400 MG/1; MG/1
TABLET, FILM COATED ORAL
COMMUNITY
Start: 2022-04-19

## 2022-04-27 NOTE — PROGRESS NOTES
Chief Complaint   Patient presents with    Leg Pain    Swelling    Wound Check         Impression and Plan:  39 y.o. male with lymphedema. He has no CHF, kidney disease and his hepatitis C appears stable due to his lab work. He most likely has developed lymphedema secondary to his IV drug use. If new wounds open up on leg patient has been advised to return the office for. Continue with lymphedema pump daily use  Appeal insurance denial of lymphedema garments which are first line treatment for leg swelling. History and Physical    Kvng Laughlin is a 39y.o. year old male with a history of bipolar disorder, polysubstance abuse, hep C and Suboxone use here for his 3-month follow-up concerning secondary lymphedema. He has finished treatment with [facility]. It did end a little early because he developed  Sores 3 weeks ago along his right lower ago. He has been using his lymphedema daily. He sees great improvement in his lower extremity edema and the wounds on his right lower leg have healed. His mother who is present at the office visit states that she is going to email the insurance company to try to get approval for the denied claim for lymphedema garments. He does have a history of drug abuse with injection. He did endorse injections both in the bilateral upper and lower extremity. He denies a family history of lymphedema. He does have a history of a chronic right leg ulcer which is healed. There are some weakened areas of concern on the left calf. He states when he is in the shower that he will scrub his legs to take off any dead skin. We discussed the importance of him leaving things covered and moisturizing his skin appropriately. He has been compliant with 20 mmHg to 30 mmHg compression, elevation and maintaining an appropriate BMI.         Past Medical History:   Diagnosis Date    Anxiety     Bipolar disorder, current episode mixed, moderate ([facility] [address] [facility] 12/28/2020    Chronic edema     BLE    Chronic ulcer of right leg (HCC)     Chronic RLE Wound    Dermatitis 12/28/2020    Hep C w/o coma, chronic ([facility] [address] [facility] 12/28/2020    History of impetigo     Left against medical advice 02/12/2021    Polysubstance abuse ([facility] [address] [facility] 10/27/2019    Methamphetamine; (4-6 caps, snorting) Heroin (2019) with IV Heroin Use several years prior (2012?)    Scabies 2012    Tobacco abuse      Patient Active Problem List   Diagnosis Code    Hep C w/o coma, chronic (HCC) B18.2    Dermatitis L30.9    Bipolar disorder, current episode mixed, moderate ([facility] [address] [facility] F31.62     Past Surgical History:   Procedure Laterality Date    HX OTHER SURGICAL Right 07/29/2014    Laceration Repair of Right Eyebrow & Right Eyelid    IR DRAIN CUTANEOUS/SUBCU ABSCESS Left 12/18/2011    Left Hand (No IR)    IR DRAIN CUTANEOUS/SUBCU ABSCESS Bilateral 11/13/2012    BUE and Right Hand I&D thought 2/2 to Scabies     Current Outpatient Medications   Medication Sig Dispense Refill    sofosbuvir-velpatasvir (EPCLUSA) 400-100 mg tablet       Suboxone 12-3 mg film sublingual film DISSOLVE 1 FILM UNDER TONGUE DAILY      hydrocortisone 2 % lotion Apply  to affected area two (2) times a day.  (Patient not taking: Reported on 1/31/2022) 1 Bottle 0    ciprofloxacin HCl (CIPRO) 500 mg tablet TAKE 1 TABLET BY MOUTH TWICE A DAY (Patient not taking: Reported on 1/31/2022)      trimethoprim-sulfamethoxazole (BACTRIM DS, SEPTRA DS) 160-800 mg per tablet TAKE 1 TABLET BY MOUTH TWICE A DAY (Patient not taking: Reported on 1/31/2022)       Allergies   Allergen Reactions    Pcn [Penicillins] Angioedema     Any type of cillin     Social History     Socioeconomic History    Marital status: SINGLE     Spouse name: Not on file    Number of children: Not on file    Years of education: Not on file    Highest education level: Not on file   Occupational History    Not on file   Tobacco Use    Smoking status: Light Tobacco Smoker     Packs/day: 0.50     Types: Cigarettes    Smokeless tobacco: Never Used    Tobacco comment: 0.25-0.5 PPD x6 years (since at least 2015)   Vaping Use    Vaping Use: Former   Substance and Sexual Activity    Alcohol use: Not Currently    Drug use: Yes     Types: Heroin, Methamphetamines, Prescription, Marijuana, IV     Comment: marijuana currently (UDS+ 10/26/2019); Previously Methamphetamines (UDS+ 10/26/2019; Last Use 10/30/2020?); Previously Heroin (Last Use 10/30/2020?) with Previous IV route Use (~2012?)    Sexual activity: Yes   Other Topics Concern     Service Not Asked    Blood Transfusions Not Asked    Caffeine Concern Not Asked    Occupational Exposure Not Asked    Hobby Hazards Not Asked    Sleep Concern Not Asked    Stress Concern Not Asked    Weight Concern Not Asked    Special Diet Not Asked    Back Care Not Asked    Exercise Not Asked    Bike Helmet Not Asked   [address] Not Asked    Self-Exams Not Asked   Social History Narrative    Not on file     Social Determinants of Health     Financial Resource Strain:     Difficulty of Paying Living Expenses: Not on file   Food Insecurity:     Worried About Running Out of Food in the Last Year: Not on file    Aline of Food in the Last Year: Not on file   Transportation Needs:     Lack of Transportation (Medical): Not on file    Lack of Transportation (Non-Medical):  Not on file   Physical Activity:     Days of Exercise per Week: Not on file    Minutes of Exercise per Session: Not on file   Stress:     Feeling of Stress : Not on file   Social Connections:     Frequency of Communication with Friends and Family: Not on file    Frequency of Social Gatherings with Friends and Family: Not on file    Attends Holiness Services: Not on file    Active Member of Clubs or Organizations: Not on file    Attends Club or Organization Meetings: Not on file    Marital Status: Not on file   Intimate Partner Violence:     Fear of Current or Ex-Partner: Not on file   Rebeka Marquez Emotionally Abused: Not on file    Physically Abused: Not on file    Sexually Abused: Not on file   Housing Stability:     Unable to Pay for Housing in the Last Year: Not on file    Number of Places Lived in the Last Year: Not on file    Unstable Housing in the Last Year: Not on file      History reviewed. No pertinent family history. Review of Systems    General: negative for fever   Eyes: negative for vision loss   HENT: negative for cold symptoms   Respiratory negative for shortness of breath   Cardiac: negative for chest pain   Vascular negative for foot pain at night    Gastrointestinal: negative for abdominal pain   Genitourinary: negative for dysuria    Endocrine: negative for excessive thirst   Skin: negative for rash   Neurological: negative for paralysis   Psychiatric: negative for depression          Physical Exam:    Visit Vitals  /86 (BP 1 Location: Left arm, BP Patient Position: Sitting)   Pulse 73   Ht 5' 11\" (1.803 m)   Wt 199 lb 15.3 oz (90.7 kg)   SpO2 98%   BMI 27.89 kg/m²      Constitutional:  Patient is well developed, well nourished, and not distressed. HEENT: atraumatic, normocephalic, wearing a mask. Eyes:   Cunjunctivae clear, no scleral icterus  Neck:   No JVD present. Cardiovascular:  Normal rate, regular rhythm, normal heart sounds. No murmur heard. Pulmonary/Chest: Effort normal .  Extremities: Normal range of motion. Neurological:  he  is alert and oriented x3 . Gait normal. Motor & sensory grossly intact in all 4 limbs. Psych: Appropriate mood and affect. Skin:  Skin is warm and dry. No ulcerations. Previous         Clinician signature: Johnny Rios NP    Date of evaluation: 4/27/2022        The treatment plan was reviewed with the patient in detail. The patient voiced understanding of this plan and all questions and concerns were addressed. The patient agrees with this plan.   We discussed the signs and symptoms that would require earlier attention or intervention. I appreciate the opportunity to participate in the care of your patient. I will be sure to keep you informed of any subsequent changes in the treatment plan. If you have any questions or concerns, please feel free to contact me.       Ashlee Valencia [facility]  Vascular Nurse [address]  [phone]

## 2022-04-27 NOTE — PROGRESS NOTES
1. Have you been to the ER, urgent care clinic since your last visit? No       Hospitalized since your last visit? No     2. Have you seen or consulted any other health care providers outside of the [address] [place] [address] since your last visit? Include any pap smears or colon screening.   No

## 2023-10-31 ENCOUNTER — OFFICE VISIT (OUTPATIENT)
Age: 43
End: 2023-10-31
Payer: COMMERCIAL

## 2023-10-31 VITALS
BODY MASS INDEX: 27.99 KG/M2 | OXYGEN SATURATION: 98 % | DIASTOLIC BLOOD PRESSURE: 74 MMHG | WEIGHT: 199.96 LBS | HEART RATE: 88 BPM | HEIGHT: 71 IN | SYSTOLIC BLOOD PRESSURE: 132 MMHG

## 2023-10-31 DIAGNOSIS — S81.801A LEG WOUND, RIGHT, INITIAL ENCOUNTER: Primary | ICD-10-CM

## 2023-10-31 DIAGNOSIS — L97.911 SKIN ULCER OF RIGHT LOWER LEG, LIMITED TO BREAKDOWN OF SKIN (HCC): ICD-10-CM

## 2023-10-31 PROCEDURE — 99203 OFFICE O/P NEW LOW 30 MIN: CPT | Performed by: NURSE PRACTITIONER

## 2023-10-31 PROCEDURE — 29580 STRAPPING UNNA BOOT: CPT | Performed by: NURSE PRACTITIONER

## 2023-10-31 ASSESSMENT — PATIENT HEALTH QUESTIONNAIRE - PHQ9
SUM OF ALL RESPONSES TO PHQ9 QUESTIONS 1 & 2: 2
2. FEELING DOWN, DEPRESSED OR HOPELESS: 1
SUM OF ALL RESPONSES TO PHQ QUESTIONS 1-9: 2
1. LITTLE INTEREST OR PLEASURE IN DOING THINGS: 1
SUM OF ALL RESPONSES TO PHQ QUESTIONS 1-9: 2

## 2023-10-31 NOTE — PROGRESS NOTES
1. Have you been to the ER, urgent care clinic since your last visit? No       Hospitalized since your last visit? No     2. Have you seen or consulted any other health care providers outside of the [address] [place] [address] since your last visit? Include any pap smears or colon screening.   No
[facility]  [address]  Dept: [phone]           Chart reviewed for the following:   Haris Castañeda MA, have reviewed the medications and updated the Allergic reactions for [place] performed immediately prior to start of procedure:   Haris Castañeda MA, have performed the following reviews on Cody Shines prior to the start of the procedure:            * Patient was identified by name and date of birth   * Agreement that Jilda See boot removed and reapplied   * Procedure site verified   * Patient was positioned for comfort  * Verbal consent was given by patient     Time: 1 pm       Date of procedure: 10/31/2023    Procedure performed by:  JEROMY Allan NP    How tolerated by patient:  tolerated well     Comments:  romoved old dressing, cleanwd wound with vashe and put vashe soak guaze abd pad and applied nathalia boot and coban to wound , measures 5 cm long X 3.5 cm wide , pictures in media
oriented x3 . Gait normal. Motor & sensory grossly intact in all 4 limbs. Psych: Appropriate mood and affect. Skin:  Skin is warm and dry. No ulcerations. Previous                       Previous               Clinician signature: Charli Duval NP    Date of evaluation: 4/27/2022            The treatment plan was reviewed with the patient in detail. The patient voiced understanding of this plan and all questions and concerns were addressed. The patient agrees with this plan. We discussed the signs and symptoms that would require earlier  attention or intervention. I appreciate the opportunity to participate in the care of your patient. I will be sure to keep you informed of any subsequent changes in the treatment plan. If you have any questions or concerns, please feel free to contact me.          Charli Duval [facility]   Vascular Nurse [address] [place] [address],[address]   [phone]

## 2023-11-08 ENCOUNTER — NURSE ONLY (OUTPATIENT)
Age: 43
End: 2023-11-08

## 2023-11-08 DIAGNOSIS — S81.801A LEG WOUND, RIGHT, INITIAL ENCOUNTER: Primary | ICD-10-CM

## 2023-11-08 NOTE — PROGRESS NOTES
[facility]  [address]  Dept: [phone]           Chart reviewed for the following:   Pradip Nazario MA, have reviewed the medications and updated the Allergic reactions for David Middleton performed immediately prior to start of procedure:   Anival WALLER MA, have performed the following reviews on Aidan Rice prior to the start of the procedure:            * Patient was identified by name and date of birth   * Agreement that Catheryn Heads boot removed and reapplied   * Procedure site verified   * Patient was positioned for comfort  * Verbal consent was given by patient     Time: 1 pm       Date of procedure: 11/8/2023    Procedure performed by:  VVS NURSE    How tolerated by patient:  tolerated well     Comments:  romoved old dressing, cleanwd wound with vashe and put vashe soak guaze abd pad and applied nathalia boot and coban to wound , measures 4.5 cm long X 3.0 cm wide , pictures in media

## 2023-11-15 ENCOUNTER — NURSE ONLY (OUTPATIENT)
Age: 43
End: 2023-11-15

## 2023-11-15 DIAGNOSIS — S81.801A LEG WOUND, RIGHT, INITIAL ENCOUNTER: Primary | ICD-10-CM

## 2023-11-15 NOTE — PROGRESS NOTES
[facility]  [address]  Dept: [phone]           Chart reviewed for the following:   Rylan WALLER [place], have reviewed the medications and updated the Allergic reactions for David Middleton performed immediately prior to start of procedure:   Rylan WALLER [place], have performed the following reviews on [facility] prior to the start of the procedure:            * Patient was identified by name and date of birth   * Agreement that Norma Santa Claus boot removed and reapplied   * Procedure site verified   * Patient was positioned for comfort  * Verbal consent was given by patient     Time: 1400      Date of procedure: 11/15/2023    Procedure performed by:  VVS NURSE    How tolerated by patient: Pt c/o pain and some swelling. Wound slightly draining                                                                                 Comments:  Applied adaptic, aquacel and unna boot to right leg.

## 2024-01-16 ENCOUNTER — OFFICE VISIT (OUTPATIENT)
Age: 44
End: 2024-01-16
Payer: COMMERCIAL

## 2024-01-16 VITALS
HEIGHT: 71 IN | WEIGHT: 199 LBS | BODY MASS INDEX: 27.86 KG/M2 | DIASTOLIC BLOOD PRESSURE: 70 MMHG | SYSTOLIC BLOOD PRESSURE: 120 MMHG | OXYGEN SATURATION: 99 % | HEART RATE: 93 BPM

## 2024-01-16 DIAGNOSIS — L03.115 CELLULITIS OF RIGHT LOWER EXTREMITY: Primary | ICD-10-CM

## 2024-01-16 PROCEDURE — 99214 OFFICE O/P EST MOD 30 MIN: CPT | Performed by: SURGERY

## 2024-01-16 RX ORDER — DOXYCYCLINE HYCLATE 100 MG
100 TABLET ORAL 2 TIMES DAILY
Qty: 28 TABLET | Refills: 0 | Status: SHIPPED | OUTPATIENT
Start: 2024-01-16 | End: 2024-01-30

## 2024-01-16 RX ORDER — FLUOXETINE 10 MG/1
10 CAPSULE ORAL DAILY
COMMUNITY

## 2024-01-16 NOTE — PROGRESS NOTES
Pt refused unna boot. He said he has materials at home. He wants to shower and he change it himself. I advised him that he needs compression and elevation.   
 Pulse 93   Ht 1.803 m (5' 11\")   Wt 90.3 kg (199 lb)   SpO2 99%   BMI 27.75 kg/m²      Constitutional:  Patient is well developed, well nourished, and not distressed.   Cardiovascular:  Normal rate, regular rhythm  Pulses:  Right:      Dorsalis      2+    Left:        Dorsalis      2+         Pulmonary/Chest: Effort normal    Extremities: Normal range of motion. 2+ bilateral pitting and non-pitting edema. severe hyperpigmentation, severe lipodermatosclerosis. Varicose veins are not present, multiple ulcerations bilaterally, largest wound on the right calf, painful to palpation, no drainage or odor but with some surrounding erythema. Digits no cyanosis or clubbing  Neurological:  he  is alert and oriented x3 . Gait normal.   Psych: Appropriate mood and affect.   Skin:  Skin is warm and dry. No rash noted.      Impression and Plan:  Maximo Balderrama is a 43 y.o. male with BLE lymphedema and non-healing chronic RLE wound.     1. RLE venous insufficiency, CEAP classification C6 Ep As Pr.     Reflux study with clinically significant reflux in right great and small saphenous veins. Maximal reflux times up to 2.8 seconds.   Venous pathophysiology and treatment options, including surgical treatment, were discussed with the patient in detail.  He will be a candidate for ablation as soon as we have calf edema better controlled (see below)    2. RLE lymphedema with ulceration and cellulitis.   - doxycycline ordered. Asked patient for local pharmacy but he declined, so abx sent to mail order pharmacy.   - discussed importance of wound care, elevation and compression with patient.   - recommended bilateral unna boots, R leg wound care (hydrofera blue) with weekly follow ups  - also discussed importance of elevation.     Patient agreed with plan but when nurse entered the room to provide wound care and apply unna boot compression wrap, patient refused and states he had to get home.     Patient refused care. This is the second

## 2024-01-22 ENCOUNTER — NURSE ONLY (OUTPATIENT)
Age: 44
End: 2024-01-22

## 2024-01-22 RX ORDER — CIPROFLOXACIN 500 MG/1
500 TABLET, FILM COATED ORAL 2 TIMES DAILY
Qty: 14 TABLET | Refills: 0 | Status: CANCELLED | OUTPATIENT
Start: 2024-01-22 | End: 2024-01-29

## 2024-01-22 NOTE — PROGRESS NOTES
[facility]  [address]  [facility] [address]  Dept: [phone]           Chart reviewed for the following:   Gisele WALLER MA, have reviewed the medications and updated the Allergic reactions for Maximo Balderrama     TIME OUT performed immediately prior to start of procedure:   Gisele WALLER MA, have performed the following reviews on Maximo Balderrama prior to the start of the procedure:            * Patient was identified by name and date of birth   * Agreement that nathalia boot removed and reapplied   * Procedure site verified   * Patient was positioned for comfort  * Verbal consent was given by patient     Time: 1300      Date of procedure: 1/22/2024    Procedure performed by:  VVS NURSE    How tolerated by patient:   Painful and has an odor and heavy drainage.                                                                                   Comments:  Applied hydrofera blue to right leg and aquaphor, unna boot with coban. Applied aquaphor and unna boot to left leg.

## 2024-02-01 ENCOUNTER — NURSE ONLY (OUTPATIENT)
Age: 44
End: 2024-02-01

## 2024-02-01 DIAGNOSIS — S81.801A LEG WOUND, RIGHT, INITIAL ENCOUNTER: Primary | ICD-10-CM

## 2024-02-01 NOTE — PROGRESS NOTES
[facility]  [address]  [facility] [address]  Dept: [phone]           Chart reviewed for the following:   Gisele WALLER MA, have reviewed the medications and updated the Allergic reactions for Maximo Balderrama     TIME OUT performed immediately prior to start of procedure:   Gisele WALLER MA, have performed the following reviews on Maximo Balderrama prior to the start of the procedure:            * Patient was identified by name and date of birth   * Agreement that nathalia boot removed and reapplied   * Procedure site verified   * Patient was positioned for comfort  * Verbal consent was given by patient     Time: 1500      Date of procedure: 2/1/2024    Procedure performed by:  VVS NURSE    How tolerated by patient: Swelling in the right leg due to unna boot slipping down. But wound getting better. Moderate drainage.                                                                                   Comments:  Applied medi-honey to right leg with hydrocortisone and unna boot with coban.  Applied hydrocortisone and unna boot with coban to left leg.

## 2024-02-06 ENCOUNTER — TELEPHONE (OUTPATIENT)
Age: 44
End: 2024-02-06

## 2024-02-06 RX ORDER — DOXYCYCLINE HYCLATE 100 MG
100 TABLET ORAL 2 TIMES DAILY
Qty: 28 TABLET | Refills: 0 | Status: SHIPPED | OUTPATIENT
Start: 2024-02-06 | End: 2024-02-20

## 2024-02-06 NOTE — TELEPHONE ENCOUNTER
Order for doxycycline 100 mg take 1 bid #28/0 per verbal order from Dr. Macedo. Had to be redone as it went to pt's mail order pharmacy and not local.

## 2024-02-06 NOTE — TELEPHONE ENCOUNTER
Patient called about an antibiotic that Dr. Macedo wanted him to began taking. The patient is waiting on the medication.

## 2024-02-08 ENCOUNTER — NURSE ONLY (OUTPATIENT)
Age: 44
End: 2024-02-08

## 2024-02-08 DIAGNOSIS — S81.801A LEG WOUND, RIGHT, INITIAL ENCOUNTER: Primary | ICD-10-CM

## 2024-02-08 NOTE — PROGRESS NOTES
[facility]  [address]  [facility] [address]  Dept: [phone]           Chart reviewed for the following:   Gisele WALLER MA, have reviewed the medications and updated the Allergic reactions for Maximo Balderrama     TIME OUT performed immediately prior to start of procedure:   Gisele WALLER MA, have performed the following reviews on Maximo Balderrama prior to the start of the procedure:            * Patient was identified by name and date of birth   * Agreement that nathalia boot removed and reapplied   * Procedure site verified   * Patient was positioned for comfort  * Verbal consent was given by patient     Time: 1330      Date of procedure: 2/8/2024    Procedure performed by:  VVS NURSE    How tolerated by patient:   Wounds improving, swelling down and                                                                                    Comments:  Applied hydrofera blue to right leg and unna boot with coban.

## 2024-02-14 ENCOUNTER — NURSE ONLY (OUTPATIENT)
Age: 44
End: 2024-02-14

## 2024-02-14 DIAGNOSIS — S81.801A LEG WOUND, RIGHT, INITIAL ENCOUNTER: Primary | ICD-10-CM

## 2024-02-14 NOTE — PROGRESS NOTES
[facility]  [address]  [facility] [address]  Dept: [phone]           Chart reviewed for the following:   Gisele WALLER MA, have reviewed the medications and updated the Allergic reactions for Maximo Balderrama     TIME OUT performed immediately prior to start of procedure:   Gisele WALLER MA, have performed the following reviews on Maximo Balderrama prior to the start of the procedure:            * Patient was identified by name and date of birth   * Agreement that nathalia boot removed and reapplied   * Procedure site verified   * Patient was positioned for comfort  * Verbal consent was given by patient     Time: 1430      Date of procedure: 2/14/2024    Procedure performed by:  VVS NURSE    How tolerated by patient:    Wound significantly healing and swelling is subsiding. Continues to take antibiotics                                                                                  Comments:  Applied hydrofera blue and unna boot with coban to right leg. Wearing compression on left leg

## 2024-04-08 ENCOUNTER — NURSE ONLY (OUTPATIENT)
Age: 44
End: 2024-04-08

## 2024-04-08 NOTE — PROGRESS NOTES
[facility]  [address]  [facility] [address]  Dept: [phone]           Chart reviewed for the following:   Gisele WALLER MA, have reviewed the medications and updated the Allergic reactions for Maximo Balderrama     TIME OUT performed immediately prior to start of procedure:   Gisele WALLER MA, have performed the following reviews on Maximo Balderrama prior to the start of the procedure:            * Patient was identified by name and date of birth   * Agreement that nathalia boot removed and reapplied   * Procedure site verified   * Patient was positioned for comfort  * Verbal consent was given by patient     Time: 1100      Date of procedure: 4/8/2024    Procedure performed by:  VVS NURSE    How tolerated by patient: Tolerated                                                                                       Comments:  Applied adaptic to right leg with Calcium Aginate Ag, and ABD Pad with unna boot with kerlix and coban. Left leg applied unna boot with kerlix and coban

## 2024-04-15 ENCOUNTER — NURSE ONLY (OUTPATIENT)
Age: 44
End: 2024-04-15
Payer: COMMERCIAL

## 2024-04-15 VITALS — WEIGHT: 199 LBS | BODY MASS INDEX: 27.86 KG/M2 | HEIGHT: 71 IN

## 2024-04-15 DIAGNOSIS — S81.801A LEG WOUND, RIGHT, INITIAL ENCOUNTER: ICD-10-CM

## 2024-04-15 DIAGNOSIS — L97.911 SKIN ULCER OF RIGHT LOWER LEG, LIMITED TO BREAKDOWN OF SKIN (HCC): Primary | ICD-10-CM

## 2024-04-15 PROCEDURE — 99211 OFF/OP EST MAY X REQ PHY/QHP: CPT | Performed by: SURGERY

## 2024-04-15 PROCEDURE — 29580 STRAPPING UNNA BOOT: CPT | Performed by: SURGERY

## 2024-04-15 ASSESSMENT — PATIENT HEALTH QUESTIONNAIRE - PHQ9
SUM OF ALL RESPONSES TO PHQ QUESTIONS 1-9: 2
10. IF YOU CHECKED OFF ANY PROBLEMS, HOW DIFFICULT HAVE THESE PROBLEMS MADE IT FOR YOU TO DO YOUR WORK, TAKE CARE OF THINGS AT HOME, OR GET ALONG WITH OTHER PEOPLE: NOT DIFFICULT AT ALL
3. TROUBLE FALLING OR STAYING ASLEEP: SEVERAL DAYS
SUM OF ALL RESPONSES TO PHQ QUESTIONS 1-9: 2
1. LITTLE INTEREST OR PLEASURE IN DOING THINGS: NOT AT ALL
2. FEELING DOWN, DEPRESSED OR HOPELESS: NOT AT ALL
7. TROUBLE CONCENTRATING ON THINGS, SUCH AS READING THE NEWSPAPER OR WATCHING TELEVISION: SEVERAL DAYS
5. POOR APPETITE OR OVEREATING: NOT AT ALL
4. FEELING TIRED OR HAVING LITTLE ENERGY: NOT AT ALL
9. THOUGHTS THAT YOU WOULD BE BETTER OFF DEAD, OR OF HURTING YOURSELF: NOT AT ALL
SUM OF ALL RESPONSES TO PHQ QUESTIONS 1-9: 2
6. FEELING BAD ABOUT YOURSELF - OR THAT YOU ARE A FAILURE OR HAVE LET YOURSELF OR YOUR FAMILY DOWN: NOT AT ALL
8. MOVING OR SPEAKING SO SLOWLY THAT OTHER PEOPLE COULD HAVE NOTICED. OR THE OPPOSITE, BEING SO FIGETY OR RESTLESS THAT YOU HAVE BEEN MOVING AROUND A LOT MORE THAN USUAL: NOT AT ALL
SUM OF ALL RESPONSES TO PHQ QUESTIONS 1-9: 2
SUM OF ALL RESPONSES TO PHQ9 QUESTIONS 1 & 2: 0

## 2024-04-15 NOTE — PROGRESS NOTES
[facility]  [address]  [facility] [address]  Dept: [phone]           Chart reviewed for the following:   Kathie WALLER RN, have reviewed the medications and updated the Allergic reactions for Maximo Balderrama     TIME OUT performed immediately prior to start of procedure:   Kathie WALLER RN, have performed the following reviews on Maximo Balderrama prior to the start of the procedure:            * Patient was identified by name and date of birth   * Agreement that nathalia boot removed and reapplied   * Procedure site verified   * Patient was positioned for comfort  * Verbal consent was given by patient     Time: 1300      Date of procedure: 4/15/2024    Procedure performed by:  VVS NURSE    How tolerated by patient:        tolerated well                                                                               Comments:  pt ambulated to the room , old dressings removed , pictures taken, legs washed and patted dry , small amount of drainage on posterior of the right calf, applied aqua phor to both legs , adaptic and aqua isai to wound on right leg , no openings on the left. Applied nathalia boot kerlex and coban pt will return in 1 week.

## 2024-04-30 ENCOUNTER — NURSE ONLY (OUTPATIENT)
Age: 44
End: 2024-04-30

## 2024-04-30 DIAGNOSIS — S81.801A LEG WOUND, RIGHT, INITIAL ENCOUNTER: Primary | ICD-10-CM

## 2024-04-30 NOTE — PROGRESS NOTES
[facility]  [address]  [facility] [address]  Dept: [phone]           Chart reviewed for the following:   Sandip WALLER, have reviewed the medications and updated the Allergic reactions for Maximo Balderrama     TIME OUT performed immediately prior to start of procedure:   Sandip WALLER, have performed the following reviews on Maximo Balderrama prior to the start of the procedure:            * Patient was identified by name and date of birth   * Agreement that nathalia boot removed and reapplied   * Procedure site verified   * Patient was positioned for comfort  * Verbal consent was given by patient     Time: 1400      Date of procedure: 4/30/2024    Procedure performed by:  VVS NURSE    How tolerated by patient: Pt had mild swelling in left leg and foot. Dressing was mildly soiled on Right Leg.                                                                   Comments:  Pt  ambulated to room,removed old dressings, wash both legs,and patted dry. Pictures were taken, applied Aquaphor to both legs. Left leg was placed in double Tubigrip F, no open wounds.Right Leg applied adaptic and aquacel, Mepliex ,Unna boot with Kerlix and Coban.

## 2024-05-09 ENCOUNTER — NURSE ONLY (OUTPATIENT)
Age: 44
End: 2024-05-09

## 2024-05-09 DIAGNOSIS — L97.911 SKIN ULCER OF RIGHT LOWER LEG, LIMITED TO BREAKDOWN OF SKIN (HCC): Primary | ICD-10-CM

## 2024-05-28 ENCOUNTER — NURSE ONLY (OUTPATIENT)
Age: 44
End: 2024-05-28

## 2024-05-28 DIAGNOSIS — L97.911 SKIN ULCER OF RIGHT LOWER LEG, LIMITED TO BREAKDOWN OF SKIN (HCC): Primary | ICD-10-CM

## 2024-05-28 NOTE — PROGRESS NOTES
[facility]  [address],  [address]  [facility] [address]  Dept: [phone]           Chart reviewed for the following:   Gisele WALLER MA, have reviewed the medications and updated the Allergic reactions for Maximo Balderrama     TIME OUT performed immediately prior to start of procedure:   Gisele WALLER MA, have performed the following reviews on Maximo Balderrama prior to the start of the procedure:            * Patient was identified by name and date of birth   * Agreement that nathalia boot removed and reapplied   * Procedure site verified   * Patient was positioned for comfort  * Verbal consent was given by patient     Time: 1430      Date of procedure: 5/28/2024    Procedure performed by:  VVS NURSE    How tolerated by patient:     Tolerated                                                                                  Comments:  Applied adaptic, aquacel and mepilex, unna boot with kerlix and coban to right leg. Tubigrip F to left leg.

## 2024-06-03 ENCOUNTER — NURSE ONLY (OUTPATIENT)
Age: 44
End: 2024-06-03
Payer: COMMERCIAL

## 2024-06-03 VITALS — WEIGHT: 199 LBS | HEIGHT: 71 IN | BODY MASS INDEX: 27.86 KG/M2

## 2024-06-03 DIAGNOSIS — L97.911 SKIN ULCER OF RIGHT LOWER LEG, LIMITED TO BREAKDOWN OF SKIN (HCC): Primary | ICD-10-CM

## 2024-06-03 PROCEDURE — 29580 STRAPPING UNNA BOOT: CPT | Performed by: SURGERY

## 2024-06-03 NOTE — PROGRESS NOTES
[facility]  [address],  [address]  [facility] [address]  Dept: [phone]           Chart reviewed for the following:   Kathie WALLER RN, have reviewed the medications and updated the Allergic reactions for Maximo Balderrama     TIME OUT performed immediately prior to start of procedure:   Kathie WALLER RN, have performed the following reviews on Maximo Balderrama prior to the start of the procedure:            * Patient was identified by name and date of birth   * Agreement that nathalia boot removed and reapplied   * Procedure site verified   * Patient was positioned for comfort  * Verbal consent was given by patient     Time: 1430      Date of procedure: 6/3/2024    Procedure performed by:  VVS NURSE    How tolerated by patient: pt tolerated well    Comments:  pt ambulated to the room , old dressing removed , legs washed and patted dry , pictures taken of right leg wound . Applied aqual isai ag , mepliex , nathalia boot, kerlex and coban to right leg, applied aqua phor to left leg , pt did not wear tubi  to office , encouraged to wear at home. Pt request to not be wrapped due wanting to take a shower, advised pt he needs the compression from the boot , to use a garbage bag taped at the top to keep dressing dry during shower, pt agreed. Pt will return next week.

## 2024-06-11 ENCOUNTER — TELEPHONE (OUTPATIENT)
Age: 44
End: 2024-06-11

## 2024-06-11 NOTE — TELEPHONE ENCOUNTER
Patient called and states he can't make it today , offered pt tomorrow but pt wanted to come next week, advised against this as he needs dressing change but pt states he does not want to come in this week. Pt was rescheduled to next week

## 2024-07-05 ENCOUNTER — NURSE ONLY (OUTPATIENT)
Age: 44
End: 2024-07-05

## 2024-07-05 DIAGNOSIS — L97.911 SKIN ULCER OF RIGHT LOWER LEG, LIMITED TO BREAKDOWN OF SKIN (HCC): Primary | ICD-10-CM

## 2024-07-05 NOTE — PROGRESS NOTES
[facility]  [address],  [address]  [facility] [address]  Dept: [phone]           Chart reviewed for the following:   Sandip WALLER, have reviewed the medications and updated the Allergic reactions for Maximo Balderrama     TIME OUT performed immediately prior to start of procedure:   Sandip WALLER, have performed the following reviews on Maximo Balderrama prior to the start of the procedure:            * Patient was identified by name and date of birth   * Agreement that nathalia boot removed and reapplied   * Procedure site verified   * Patient was positioned for comfort  * Verbal consent was given by patient     Time: 0130      Date of procedure: 7/5/2024    Procedure performed by:  VVS NURSE    How tolerated by patient: Pt Tolerated well, pt did complaint of slight dryness and itching.     Comments:  Washed Legs thoroughly. Applied Hydrocortisone and Aquaphor. Applied Aquacel and Allyven to wound. Placed pt in Unna boot with Kerlix and Coban.

## 2024-07-16 ENCOUNTER — OFFICE VISIT (OUTPATIENT)
Age: 44
End: 2024-07-16
Payer: COMMERCIAL

## 2024-07-16 VITALS
WEIGHT: 250 LBS | HEIGHT: 70 IN | OXYGEN SATURATION: 96 % | DIASTOLIC BLOOD PRESSURE: 60 MMHG | HEART RATE: 112 BPM | SYSTOLIC BLOOD PRESSURE: 122 MMHG | BODY MASS INDEX: 35.79 KG/M2

## 2024-07-16 DIAGNOSIS — L97.911 SKIN ULCER OF RIGHT LOWER LEG, LIMITED TO BREAKDOWN OF SKIN (HCC): Primary | ICD-10-CM

## 2024-07-16 DIAGNOSIS — I89.0 LYMPHEDEMA: ICD-10-CM

## 2024-07-16 DIAGNOSIS — S81.801A LEG WOUND, RIGHT, INITIAL ENCOUNTER: ICD-10-CM

## 2024-07-16 PROCEDURE — 99214 OFFICE O/P EST MOD 30 MIN: CPT | Performed by: SURGERY

## 2024-07-16 PROCEDURE — 29580 STRAPPING UNNA BOOT: CPT | Performed by: SURGERY

## 2024-07-16 NOTE — PROGRESS NOTES
[facility]  [address],  [address]  [facility] [address]  Dept: [phone]           Chart reviewed for the following:   Sandip WALLER, have reviewed the medications and updated the Allergic reactions for Maximo Balderrama     TIME OUT performed immediately prior to start of procedure:   Sandip WALLER, have performed the following reviews on Maximo Balderrama prior to the start of the procedure:            * Patient was identified by name and date of birth   * Agreement that nathalia boot removed and reapplied   * Procedure site verified   * Patient was positioned for comfort  * Verbal consent was given by patient     Time: 0315      Date of procedure: 7/16/2024    Procedure performed by:  Arabella Macedo MD    How tolerated by patient: Tolerated well.    Comments:  Removed old dressings. Applied Adaptic, placed Pt's Right Leg in Unna boot with Kerlix and Coban.          Wound care provided under my direct supervision.     Arabella Macedo MD PhD  Vascular Surgery       
on file     Social Determinants of Health     Financial Resource Strain: Not on file   Food Insecurity: Not on file   Transportation Needs: Not on file   Physical Activity: Not on file   Stress: Not on file   Social Connections: Not on file   Intimate Partner Violence: Not on file   Housing Stability: Not on file      History reviewed. No pertinent family history.    Arabella Macedo MD PhD  Vascular Surgery    Patient presented with an exacerbation of a chronic problem and the new wound.  This was assessed and a new care plan devised.  For this reason, both an EM visit as well as a procedure code will be billed    I have personally spent more than 30 minutes today in preparation, patient care and documentation for this visit, including the following: reviewing the medical record, obtaining h/p, review of imaging studies and discussion with patient, formulation and discussion of treatment plan with the patient, talking to patient's mother on the phone to answer questions regarding disability paperwork and documentation in the chart.  .     
None

## 2024-07-17 PROBLEM — L97.911 SKIN ULCER OF RIGHT LOWER LEG, LIMITED TO BREAKDOWN OF SKIN (HCC): Status: ACTIVE | Noted: 2024-07-17

## 2024-07-17 PROBLEM — I89.0 LYMPHEDEMA: Status: ACTIVE | Noted: 2024-07-17

## 2024-07-17 PROBLEM — S81.801A LEG WOUND, RIGHT, INITIAL ENCOUNTER: Status: ACTIVE | Noted: 2024-07-17

## 2024-07-18 ENCOUNTER — OFFICE VISIT (OUTPATIENT)
Facility: CLINIC | Age: 44
End: 2024-07-18
Payer: COMMERCIAL

## 2024-07-18 VITALS
HEART RATE: 82 BPM | WEIGHT: 271 LBS | TEMPERATURE: 97.8 F | HEIGHT: 70 IN | RESPIRATION RATE: 18 BRPM | SYSTOLIC BLOOD PRESSURE: 123 MMHG | BODY MASS INDEX: 38.8 KG/M2 | DIASTOLIC BLOOD PRESSURE: 78 MMHG | OXYGEN SATURATION: 95 %

## 2024-07-18 DIAGNOSIS — I89.0 LYMPHEDEMA: ICD-10-CM

## 2024-07-18 DIAGNOSIS — F31.62 BIPOLAR DISORDER, CURRENT EPISODE MIXED, MODERATE (HCC): Primary | ICD-10-CM

## 2024-07-18 DIAGNOSIS — F11.20 HEROIN ADDICTION (HCC): ICD-10-CM

## 2024-07-18 DIAGNOSIS — Z13.1 DIABETES MELLITUS SCREENING: ICD-10-CM

## 2024-07-18 DIAGNOSIS — Z13.220 ENCOUNTER FOR LIPID SCREENING FOR CARDIOVASCULAR DISEASE: ICD-10-CM

## 2024-07-18 DIAGNOSIS — E55.9 VITAMIN D DEFICIENCY: ICD-10-CM

## 2024-07-18 DIAGNOSIS — I87.2 VENOUS INSUFFICIENCY OF BOTH LOWER EXTREMITIES: ICD-10-CM

## 2024-07-18 DIAGNOSIS — Z13.6 ENCOUNTER FOR LIPID SCREENING FOR CARDIOVASCULAR DISEASE: ICD-10-CM

## 2024-07-18 DIAGNOSIS — L97.911 SKIN ULCER OF RIGHT LOWER LEG, LIMITED TO BREAKDOWN OF SKIN (HCC): ICD-10-CM

## 2024-07-18 PROCEDURE — 99204 OFFICE O/P NEW MOD 45 MIN: CPT | Performed by: FAMILY MEDICINE

## 2024-07-18 RX ORDER — ERGOCALCIFEROL 1.25 MG/1
50000 CAPSULE ORAL WEEKLY
Qty: 12 CAPSULE | Refills: 1 | Status: SHIPPED | OUTPATIENT
Start: 2024-07-18

## 2024-07-18 NOTE — PROGRESS NOTES
Maximo Balderrama is a 43 y.o.  male and presents with    Chief Complaint   Patient presents with    New Patient           Subjective:  {HPI:35051}            Additional Concerns: ***         {SmartLink choices:2343629}    ROS   {ros master:034331}    All other systems reviewed and are negative.      Objective:  There were no vitals filed for this visit.      {appearance:978409}  {PE ADULT/PEDS  MALE/FEMALE:40484}  {Exam, Complete:95085}  {cvs disease exams:883811}    LABS   ***  TESTS  ***    Assessment/Plan:    There are no diagnoses linked to this encounter.     Lab review: {lab reviewed:748819}      I have discussed the diagnosis with the patient and the intended plan as seen in the above orders.  The patient has received an after-visit summary and questions were answered concerning future plans.  I have discussed medication side effects and warnings with the patient as well. I have reviewed the plan of care with the patient, accepted their input and they are in agreement with the treatment goals.     No follow-up provider specified.  *** More than 1/2 of this *** minute visit was spent in counselling and coordination of care, as described above.    
Maximo Balderrama is a 43 y.o.  male and presents with    Chief Complaint   Patient presents with    New Patient       Subjective:  Mr. Balderrama is a new patient who presents to establish care. His previous PCP closed their practice.     He has a PMH significant for Hepatitis C, bipolar disorder, and h/o polysubstance abuse and lymphedema. 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    He is followed by vascular surgery for LE wounds and lymphedema. He sees them twice weekly for wound care. He uses a lymphedema pump. He keeps his legs wrapped. He is applying for disability.     He receives Prozac through [employer]. He is working on establishing care with a psychiatrist. He reports anxiety exacerbation. Milk helps him sleep.       ROS   General ROS: negative for - chills or fever  Psychological ROS: positive for - anxiety and depression  Ophthalmic ROS: negative for - blurry vision; she wears contacts  Endocrine ROS: negative for - polydipsia/polyuria or temperature intolerance  Respiratory ROS: no cough, shortness of breath, or wheezing  Cardiovascular ROS: no chest pain or dyspnea on exertion  Gastrointestinal ROS: no abdominal pain, change in bowel habits, or black or bloody stools  Neurological ROS: negative for - numbness/tingling or weakness  Dermatological ROS: positive for - b/l LE wounds and lymphedema; negative for - rash      All other systems reviewed and are negative.      Objective:  Vitals:    07/18/24 1119   BP: 123/78   Pulse: 82   Resp: 18   Temp: 97.8 °F (36.6 °C)   SpO2: 95%       alert, well appearing, and in no distress, oriented to person, place, and time and normal appearing weight  Mental status - Pt is anxious with pressured speech  Lungs - clear to auscultation bilaterally   Heart - regular rate and rhythm, S1, S2 normal, no murmur, click, rub or gallop   Extremities - Normal range of motion. 2+ bilateral pitting and non-pitting edema.   Skin -  Legs are wrapped. Dressing 
Maximo Balderrama is a 43 y.o. presents today for   Chief Complaint   Patient presents with    New Patient     Is someone accompanying this pt? no    Is the patient using any DME equipment during OV? no  There were no vitals filed for this visit.    Depression Screenin/18/2024    11:09 AM 4/15/2024     1:23 PM 10/31/2023     1:13 PM 2022    12:48 PM 3/16/2021    11:00 AM   PHQ-9 Questionaire   Little interest or pleasure in doing things 0 0 1 0 0   Feeling down, depressed, or hopeless 0 0 1 0 0   Trouble falling or staying asleep, or sleeping too much 0 1      Feeling tired or having little energy 0 0      Poor appetite or overeating 0 0      Feeling bad about yourself - or that you are a failure or have let yourself or your family down 0 0      Trouble concentrating on things, such as reading the newspaper or watching television 0 1      Moving or speaking so slowly that other people could have noticed. Or the opposite - being so fidgety or restless that you have been moving around a lot more than usual 0 0      Thoughts that you would be better off dead, or of hurting yourself in some way 0 0      PHQ-9 Total Score 0 2 2 0 0   If you checked off any problems, how difficult have these problems made it for you to do your work, take care of things at home, or get along with other people? 0 0           Abuse Screening:       No data to display                 Learning Assessment Screening:   No question data found.     Fall Risk Screening:        No data to display                    Health Maintenance: reviewed and discussed and ordered per Provider.    Health Maintenance Due   Topic Date Due    Hepatitis B vaccine (1 of 3 - 3-dose series) Never done    COVID-19 Vaccine (1) Never done    Varicella vaccine (1 of 2 - 2-dose childhood series) Never done    Pneumococcal 0-64 years Vaccine (1 of 2 - PCV) Never done    Hepatitis A vaccine (1 of 2 - Risk 2-dose series) Never done    DTaP/Tdap/Td vaccine (1 - Tdap) 
EMT/paramedic

## 2024-07-23 ENCOUNTER — HOSPITAL ENCOUNTER (OUTPATIENT)
Facility: HOSPITAL | Age: 44
Setting detail: SPECIMEN
Discharge: HOME | End: 2024-07-26
Payer: COMMERCIAL

## 2024-07-23 DIAGNOSIS — Z13.1 DIABETES MELLITUS SCREENING: ICD-10-CM

## 2024-07-23 DIAGNOSIS — F31.62 BIPOLAR DISORDER, CURRENT EPISODE MIXED, MODERATE (HCC): ICD-10-CM

## 2024-07-23 DIAGNOSIS — Z13.6 ENCOUNTER FOR LIPID SCREENING FOR CARDIOVASCULAR DISEASE: ICD-10-CM

## 2024-07-23 DIAGNOSIS — I89.0 LYMPHEDEMA: ICD-10-CM

## 2024-07-23 DIAGNOSIS — Z13.220 ENCOUNTER FOR LIPID SCREENING FOR CARDIOVASCULAR DISEASE: ICD-10-CM

## 2024-07-23 LAB
ALBUMIN SERPL-MCNC: 3.3 G/DL (ref 3.4–5)
ALBUMIN/GLOB SERPL: 0.9 (ref 0.8–1.7)
ALP SERPL-CCNC: 105 U/L (ref 45–117)
ALT SERPL-CCNC: 20 U/L (ref 16–61)
ANION GAP SERPL CALC-SCNC: 7 MMOL/L (ref 3–18)
AST SERPL-CCNC: 17 U/L (ref 10–38)
BASOPHILS # BLD: 0.1 K/UL (ref 0–0.1)
BASOPHILS NFR BLD: 1 % (ref 0–2)
BILIRUB SERPL-MCNC: 0.2 MG/DL (ref 0.2–1)
BUN SERPL-MCNC: 11 MG/DL (ref 7–18)
BUN/CREAT SERPL: 14 (ref 12–20)
CALCIUM SERPL-MCNC: 8.5 MG/DL (ref 8.5–10.1)
CHLORIDE SERPL-SCNC: 106 MMOL/L (ref 100–111)
CHOLEST SERPL-MCNC: 200 MG/DL
CO2 SERPL-SCNC: 26 MMOL/L (ref 21–32)
CREAT SERPL-MCNC: 0.77 MG/DL (ref 0.6–1.3)
DIFFERENTIAL METHOD BLD: ABNORMAL
EOSINOPHIL # BLD: 0.4 K/UL (ref 0–0.4)
EOSINOPHIL NFR BLD: 6 % (ref 0–5)
ERYTHROCYTE [DISTWIDTH] IN BLOOD BY AUTOMATED COUNT: 13.4 % (ref 11.6–14.5)
EST. AVERAGE GLUCOSE BLD GHB EST-MCNC: 117 MG/DL
GLOBULIN SER CALC-MCNC: 3.6 G/DL (ref 2–4)
GLUCOSE SERPL-MCNC: 105 MG/DL (ref 74–99)
HBA1C MFR BLD: 5.7 % (ref 4.2–5.6)
HCT VFR BLD AUTO: 44.3 % (ref 36–48)
HDLC SERPL-MCNC: 37 MG/DL (ref 40–60)
HDLC SERPL: 5.4 (ref 0–5)
HGB BLD-MCNC: 14.1 G/DL (ref 13–16)
IMM GRANULOCYTES # BLD AUTO: 0 K/UL (ref 0–0.04)
IMM GRANULOCYTES NFR BLD AUTO: 0 % (ref 0–0.5)
LDLC SERPL CALC-MCNC: 97.6 MG/DL (ref 0–100)
LIPID PANEL: ABNORMAL
LYMPHOCYTES # BLD: 2.3 K/UL (ref 0.9–3.6)
LYMPHOCYTES NFR BLD: 29 % (ref 21–52)
MCH RBC QN AUTO: 28 PG (ref 24–34)
MCHC RBC AUTO-ENTMCNC: 31.8 G/DL (ref 31–37)
MCV RBC AUTO: 88.1 FL (ref 78–100)
MONOCYTES # BLD: 0.7 K/UL (ref 0.05–1.2)
MONOCYTES NFR BLD: 9 % (ref 3–10)
NEUTS SEG # BLD: 4.2 K/UL (ref 1.8–8)
NEUTS SEG NFR BLD: 54 % (ref 40–73)
NRBC # BLD: 0 K/UL (ref 0–0.01)
NRBC BLD-RTO: 0 PER 100 WBC
PLATELET # BLD AUTO: 400 K/UL (ref 135–420)
PMV BLD AUTO: 9.8 FL (ref 9.2–11.8)
POTASSIUM SERPL-SCNC: 4.5 MMOL/L (ref 3.5–5.5)
PROT SERPL-MCNC: 6.9 G/DL (ref 6.4–8.2)
RBC # BLD AUTO: 5.03 M/UL (ref 4.35–5.65)
SODIUM SERPL-SCNC: 139 MMOL/L (ref 136–145)
TRIGL SERPL-MCNC: 327 MG/DL
VLDLC SERPL CALC-MCNC: 65.4 MG/DL
WBC # BLD AUTO: 7.7 K/UL (ref 4.6–13.2)

## 2024-07-23 PROCEDURE — 83036 HEMOGLOBIN GLYCOSYLATED A1C: CPT

## 2024-07-23 PROCEDURE — 85025 COMPLETE CBC W/AUTO DIFF WBC: CPT

## 2024-07-23 PROCEDURE — 80053 COMPREHEN METABOLIC PANEL: CPT

## 2024-07-23 PROCEDURE — 36415 COLL VENOUS BLD VENIPUNCTURE: CPT

## 2024-07-23 PROCEDURE — 80061 LIPID PANEL: CPT

## 2024-07-30 ENCOUNTER — NURSE ONLY (OUTPATIENT)
Age: 44
End: 2024-07-30

## 2024-07-30 NOTE — PROGRESS NOTES
[facility]  [address],  [address]  [facility] [address]  Dept: [phone]           Chart reviewed for the following:   Gisele WALLER MA, have reviewed the medications and updated the Allergic reactions for Maximo Balderrama     TIME OUT performed immediately prior to start of procedure:   Gisele WALLER MA, have performed the following reviews on Maximo Balderrama prior to the start of the procedure:            * Patient was identified by name and date of birth   * Agreement that nathalia boot removed and reapplied   * Procedure site verified   * Patient was positioned for comfort  * Verbal consent was given by patient     Time: 1300      Date of procedure: 7/30/2024    Procedure performed by:  VVS NURSE    How tolerated by patient:  Some swelling on both legs                                                                                     Comments:  Applied tubigrip to left leg. Applied hydrofera blue, mepilex and unna boot with kerlix and coban to right leg.

## 2024-08-05 ENCOUNTER — OFFICE VISIT (OUTPATIENT)
Age: 44
End: 2024-08-05
Payer: COMMERCIAL

## 2024-08-05 VITALS
DIASTOLIC BLOOD PRESSURE: 74 MMHG | BODY MASS INDEX: 35.79 KG/M2 | SYSTOLIC BLOOD PRESSURE: 110 MMHG | HEIGHT: 70 IN | OXYGEN SATURATION: 97 % | WEIGHT: 250 LBS | HEART RATE: 75 BPM

## 2024-08-05 DIAGNOSIS — L97.911 SKIN ULCER OF RIGHT LOWER LEG, LIMITED TO BREAKDOWN OF SKIN (HCC): ICD-10-CM

## 2024-08-05 DIAGNOSIS — I89.0 LYMPHEDEMA: Primary | ICD-10-CM

## 2024-08-05 PROCEDURE — 99213 OFFICE O/P EST LOW 20 MIN: CPT | Performed by: SURGERY

## 2024-08-05 NOTE — PROGRESS NOTES
Maximo Balderrama    Chief Complaint   Patient presents with    Leg ulcer      Follow up        History and Physical    Maximo Balderrama is a 43 y.o. male with PMH significant for Hepatitis C, bipolar disorder and, h/o polysubstance abuse and lymphedema.     he returns today for wounds.     Since his last visit:   - RLE wound has worsened  - uses lymphedema pump daily  -Wounds are painful at times.  Has not noticed much drainage    Today:   Right lecm x 3 cm     Left:        Last visit:            Previously obtained venous history:   2024 visit:  he states he has had wounds for the past 5 years. He states that the wounds almost close but then flare up and worsen.   He has been non-compliant with wound care and just puts vaseline on the wound and wraps.   States he intermittently elevates. He doesn't wear compression stockings regularly    Has been diagnosed with lymphedema, has lymphedema pump but doesn't use it because he states the wounds are painful.    Has smaller open wounds on the left as well.     Previously started on RLE unna boot therapy but patient hasn't followed up for wound care appointments.       Associated symptoms:   [x] edema  [] varicose veins  [] heaviness/aching  [] fatigue  [] Pain  [x] H/o and current ulcer(s)      Patient   [] has   [x] has not   been wearing compression stockings.       Relevant history:   [] female gender  [] Family history of venous disease: no  [] history of pregnancy: n/a  [] history of DVT/PE  [] history of vein procedure      The most recent PVL was reviewed and discussed with the patient. This shows clinically significant venous insufficiency in the right great and small saphenous veins with associated varicose veins    .   Interpretation Summary         Technically difficult exam secondary to edema with poor visualization of the deep veins in the calf.    No evidence of deep vein or superficial vein thrombosis in the right lower extremity veins

## 2024-08-05 NOTE — PROGRESS NOTES
[facility]  [address],  [address]  [facility] [address]  Dept: [phone]           Chart reviewed for the following:   Gisele WALLER MA, have reviewed the medications and updated the Allergic reactions for Maximo Balderrama     TIME OUT performed immediately prior to start of procedure:   Gisele WALLER MA, have performed the following reviews on Maximo Balderrama prior to the start of the procedure:            * Patient was identified by name and date of birth   * Agreement that nathalia boot removed and reapplied   * Procedure site verified   * Patient was positioned for comfort  * Verbal consent was given by patient     Time: 1600      Date of procedure: 8/5/2024    Procedure performed by:  Arabella Macedo MD    How tolerated by patient: Tolerated                                                                                     Comments:  Applied hydrogel, hydrofera blue, mepilex and unna boot with kerlix and coban to right leg.

## 2024-08-12 ENCOUNTER — NURSE ONLY (OUTPATIENT)
Age: 44
End: 2024-08-12

## 2024-08-12 DIAGNOSIS — I89.0 LYMPHEDEMA: Primary | ICD-10-CM

## 2024-08-12 NOTE — PROGRESS NOTES
[facility]  [address],  [address]  [facility] [address]  Dept: [phone]           Chart reviewed for the following:   Sandip WALLER, have reviewed the medications and updated the Allergic reactions for Maximo Balderrama     TIME OUT performed immediately prior to start of procedure:   Sandip WALLER, have performed the following reviews on Maximo Balderrama prior to the start of the procedure:            * Patient was identified by name and date of birth   * Agreement that nathalia boot removed and reapplied   * Procedure site verified   * Patient was positioned for comfort  * Verbal consent was given by patient     Time: 0230      Date of procedure: 8/12/2024    Procedure performed by:  VVS NURSE    How tolerated by patient: Tolerated well. Pt legs were very swelling today.    Comments:  Removed old dressings, washed both legs. Applied Hydrogel to wound bed with Mepilex on top.Placed right leg in Unna boot with Kerlix and Coban.

## 2024-08-16 ENCOUNTER — NURSE ONLY (OUTPATIENT)
Age: 44
End: 2024-08-16

## 2024-08-16 DIAGNOSIS — I89.0 LYMPHEDEMA: Primary | ICD-10-CM

## 2024-08-16 NOTE — PROGRESS NOTES
[facility]  [address],  [address]  [facility] [address]  Dept: [phone]           Chart reviewed for the following:   Sandip WALLER, have reviewed the medications and updated the Allergic reactions for Maximo Balderrama     TIME OUT performed immediately prior to start of procedure:   Sandip WALLER, have performed the following reviews on Maximo Balderrama prior to the start of the procedure:            * Patient was identified by name and date of birth   * Agreement that nathalia boot removed and reapplied   * Procedure site verified   * Patient was positioned for comfort  * Verbal consent was given by patient     Time: 0200      Date of procedure: 8/16/2024    Procedure performed by:  VVS NURSE    How tolerated by patient: Tolerated well, no drainage on right leg. Left leg red, dry, and swollen.    Comments:  Removed old dressings, washed legs thoroughly. Applied Hydrocortisone and Aquaphor to both legs. Applied Zinc around right wound bed. Applied Hydrofera blue, ABD Pad to right leg. Placed Pt in bilateral Unna boots with Kerlix and Coban.

## 2024-08-19 ENCOUNTER — OFFICE VISIT (OUTPATIENT)
Facility: CLINIC | Age: 44
End: 2024-08-19
Payer: COMMERCIAL

## 2024-08-19 VITALS
DIASTOLIC BLOOD PRESSURE: 75 MMHG | HEART RATE: 83 BPM | BODY MASS INDEX: 37.8 KG/M2 | WEIGHT: 270 LBS | SYSTOLIC BLOOD PRESSURE: 106 MMHG | OXYGEN SATURATION: 93 % | HEIGHT: 71 IN | RESPIRATION RATE: 20 BRPM | TEMPERATURE: 98.1 F

## 2024-08-19 DIAGNOSIS — L97.911 SKIN ULCER OF RIGHT LOWER LEG, LIMITED TO BREAKDOWN OF SKIN (HCC): ICD-10-CM

## 2024-08-19 DIAGNOSIS — E78.1 HYPERTRIGLYCERIDEMIA: ICD-10-CM

## 2024-08-19 DIAGNOSIS — Z00.00 ANNUAL PHYSICAL EXAM: Primary | ICD-10-CM

## 2024-08-19 DIAGNOSIS — F31.62 BIPOLAR DISORDER, CURRENT EPISODE MIXED, MODERATE (HCC): ICD-10-CM

## 2024-08-19 DIAGNOSIS — E55.9 VITAMIN D DEFICIENCY: ICD-10-CM

## 2024-08-19 DIAGNOSIS — R73.01 IMPAIRED FASTING GLUCOSE: ICD-10-CM

## 2024-08-19 DIAGNOSIS — I89.0 LYMPHEDEMA: ICD-10-CM

## 2024-08-19 DIAGNOSIS — F11.20 HEROIN ADDICTION (HCC): ICD-10-CM

## 2024-08-19 PROCEDURE — 99396 PREV VISIT EST AGE 40-64: CPT | Performed by: FAMILY MEDICINE

## 2024-08-19 RX ORDER — ARIPIPRAZOLE 5 MG/1
5 TABLET ORAL DAILY
Qty: 30 TABLET | Refills: 3 | Status: SHIPPED | OUTPATIENT
Start: 2024-08-19

## 2024-08-19 RX ORDER — FLUOXETINE HYDROCHLORIDE 40 MG/1
40 CAPSULE ORAL EVERY MORNING
COMMUNITY
Start: 2024-08-05

## 2024-08-19 RX ORDER — PRAZOSIN HYDROCHLORIDE 2 MG/1
2 CAPSULE ORAL NIGHTLY
COMMUNITY
Start: 2024-08-05

## 2024-08-19 RX ORDER — OLANZAPINE 10 MG/1
10 TABLET ORAL NIGHTLY
COMMUNITY
Start: 2024-08-05 | End: 2024-08-19 | Stop reason: SINTOL

## 2024-08-19 RX ORDER — ERGOCALCIFEROL 1.25 MG/1
50000 CAPSULE ORAL WEEKLY
Qty: 12 CAPSULE | Refills: 1 | Status: SHIPPED | OUTPATIENT
Start: 2024-08-19

## 2024-08-19 ASSESSMENT — PATIENT HEALTH QUESTIONNAIRE - PHQ9
SUM OF ALL RESPONSES TO PHQ9 QUESTIONS 1 & 2: 6
SUM OF ALL RESPONSES TO PHQ QUESTIONS 1-9: 20
SUM OF ALL RESPONSES TO PHQ QUESTIONS 1-9: 20
3. TROUBLE FALLING OR STAYING ASLEEP: NEARLY EVERY DAY
4. FEELING TIRED OR HAVING LITTLE ENERGY: NEARLY EVERY DAY
1. LITTLE INTEREST OR PLEASURE IN DOING THINGS: NEARLY EVERY DAY
9. THOUGHTS THAT YOU WOULD BE BETTER OFF DEAD, OR OF HURTING YOURSELF: NOT AT ALL
5. POOR APPETITE OR OVEREATING: NEARLY EVERY DAY
2. FEELING DOWN, DEPRESSED OR HOPELESS: NEARLY EVERY DAY
10. IF YOU CHECKED OFF ANY PROBLEMS, HOW DIFFICULT HAVE THESE PROBLEMS MADE IT FOR YOU TO DO YOUR WORK, TAKE CARE OF THINGS AT HOME, OR GET ALONG WITH OTHER PEOPLE: EXTREMELY DIFFICULT
7. TROUBLE CONCENTRATING ON THINGS, SUCH AS READING THE NEWSPAPER OR WATCHING TELEVISION: MORE THAN HALF THE DAYS
6. FEELING BAD ABOUT YOURSELF - OR THAT YOU ARE A FAILURE OR HAVE LET YOURSELF OR YOUR FAMILY DOWN: NEARLY EVERY DAY
SUM OF ALL RESPONSES TO PHQ QUESTIONS 1-9: 20
SUM OF ALL RESPONSES TO PHQ QUESTIONS 1-9: 20
8. MOVING OR SPEAKING SO SLOWLY THAT OTHER PEOPLE COULD HAVE NOTICED. OR THE OPPOSITE, BEING SO FIGETY OR RESTLESS THAT YOU HAVE BEEN MOVING AROUND A LOT MORE THAN USUAL: NOT AT ALL

## 2024-08-19 ASSESSMENT — COLUMBIA-SUICIDE SEVERITY RATING SCALE - C-SSRS
1. WITHIN THE PAST MONTH, HAVE YOU WISHED YOU WERE DEAD OR WISHED YOU COULD GO TO SLEEP AND NOT WAKE UP?: NO
6. HAVE YOU EVER DONE ANYTHING, STARTED TO DO ANYTHING, OR PREPARED TO DO ANYTHING TO END YOUR LIFE?: NO
2. HAVE YOU ACTUALLY HAD ANY THOUGHTS OF KILLING YOURSELF?: NO

## 2024-08-19 NOTE — PROGRESS NOTES
Maximo Balderrama is a 43 y.o.  male and presents with    Chief Complaint   Patient presents with    Medication Check    Other     PSYCH REFERRAL TO [facility]     Annual Exam       Subjective:  Well Adult Physical   Patient here for a comprehensive physical exam.The patient reports problems - bipolar disorder and he is requesting referral to new psychiatrist for medication management: currently using prazosin, fluoxetine and olanzapine; olanzapine causes weight gain  Opioid addiction which is treated by [facility] with suboxone  Do you take any herbs or supplements that were not prescribed by a doctor? no Are you taking calcium supplements? no Are you taking aspirin daily? not applicable    ROS   General ROS: negative for - chills or fever; + weight gain  Psychological ROS: positive for - anxiety and depression  Ophthalmic ROS: negative for - blurry vision; she wears contacts  Endocrine ROS: negative for - polydipsia/polyuria or temperature intolerance  Respiratory ROS: no cough, shortness of breath, or wheezing  Cardiovascular ROS: no chest pain or dyspnea on exertion  Gastrointestinal ROS: no abdominal pain, change in bowel habits, or black or bloody stools  Neurological ROS: negative for - numbness/tingling or weakness  Dermatological ROS: positive for - b/l LE wounds and lymphedema; negative for - rash     All other systems reviewed and are negative.      Objective:    /75 (Site: Left Upper Arm, Position: Sitting, Cuff Size: Large Adult)   Pulse 83   Temp 98.1 °F (36.7 °C) (Temporal)   Resp 20   Ht 1.803 m (5' 11\")   Wt 122.5 kg (270 lb)   SpO2 93%   BMI 37.66 kg/m²     General Appearance:  Alert, cooperative, no distress, appears stated age; obesity   Head:  Normocephalic, without obvious abnormality, atraumatic   Eyes:  PERRL, conjunctiva/corneas clear, EOM's intact   Ears:  Normal TM's and external ear canals, both ears   Nose: Nares normal, septum midline, mucosa normal, no drainage or 
Has the patient had a pap smear? NO    Health Maintenance: reviewed and discussed and ordered per Provider.    Health Maintenance Due   Topic Date Due    Pneumococcal 0-64 years Vaccine (1 of 2 - PCV) Never done    Varicella vaccine (1 of 2 - 13+ 2-dose series) Never done    Hepatitis A vaccine (1 of 2 - Risk 2-dose series) Never done    Hepatitis B vaccine (1 of 3 - 19+ 3-dose series) Never done    DTaP/Tdap/Td vaccine (1 - Tdap) Never done    COVID-19 Vaccine (1 - 2023-24 season) Never done    Flu vaccine (1) Never done        [facility]  Phone: [phone]  Fax: [phone]

## 2024-08-20 ENCOUNTER — NURSE ONLY (OUTPATIENT)
Age: 44
End: 2024-08-20

## 2024-08-20 DIAGNOSIS — I89.0 LYMPHEDEMA: Primary | ICD-10-CM

## 2024-08-20 NOTE — PROGRESS NOTES
[facility]  [address],  [address]  [facility] [address]  Dept: [phone]           Chart reviewed for the following:   Sandip WALLER, have reviewed the medications and updated the Allergic reactions for Maximo Balderrama     TIME OUT performed immediately prior to start of procedure:   Sandip WALLER, have performed the following reviews on Maximo Balderrama prior to the start of the procedure:            * Patient was identified by name and date of birth   * Agreement that nathalia boot removed and reapplied   * Procedure site verified   * Patient was positioned for comfort  * Verbal consent was given by patient     Time: 0230      Date of procedure: 8/20/2024    Procedure performed by:  VVS NURSE    How tolerated by patient: Tolerated, Swelling has severely decreased in both legs.    Comments: Removed old dressings, washed legs thoroughly. Applied Hydrocortisone and Aquaphor to both legs. Applied Zinc around right wound bed. Applied Hydrofera blue,Mepilex to right leg. Placed Pt in bilateral Unna boots with Kerlix and Coban.

## 2024-08-21 ENCOUNTER — TELEPHONE (OUTPATIENT)
Facility: CLINIC | Age: 44
End: 2024-08-21

## 2024-08-21 NOTE — TELEPHONE ENCOUNTER
Referred to Psychiatry/ Leonidas Bradford  [address]  [address]  [place] [address]    P- [phone]  F- [phone]

## 2024-08-23 ENCOUNTER — NURSE ONLY (OUTPATIENT)
Age: 44
End: 2024-08-23

## 2024-08-23 ENCOUNTER — TELEPHONE (OUTPATIENT)
Age: 44
End: 2024-08-23

## 2024-08-23 VITALS — HEIGHT: 71 IN | WEIGHT: 270 LBS | BODY MASS INDEX: 37.8 KG/M2

## 2024-08-23 DIAGNOSIS — I89.0 LYMPHEDEMA: Primary | ICD-10-CM

## 2024-08-23 NOTE — PROGRESS NOTES
[facility]  [address],  [address]  [facility] [address]  Dept: [phone]           Chart reviewed for the following:   Kathie WALLER RN, have reviewed the medications and updated the Allergic reactions for Maximo Balderrama     TIME OUT performed immediately prior to start of procedure:   Kathie WALLER RN, have performed the following reviews on Maximo Balderrama prior to the start of the procedure:            * Patient was identified by name and date of birth   * Agreement that nathalia boot removed and reapplied   * Procedure site verified   * Patient was positioned for comfort  * Verbal consent was given by patient     Time: 1500      Date of procedure: 8/23/2024    Procedure performed by:  VVS NURSE    How tolerated by patient: tolerated well    Comments:  Pt ambulated to the room, pt had removed nathalia boots and showered. Applied hydraphera blue to right leg wound, abd pad, nathalia boot kerlex and coban. Right leg no wound visible but placed in nathalia boot kerlex and coban for swelling  Pt to return next week for dressing change.

## 2024-08-23 NOTE — TELEPHONE ENCOUNTER
----- Message from Cait SIMMONS sent at 8/23/2024  2:13 PM EDT -----  Kellee from [facility] called and stated they received the referral but unfortunately they're not able to accept him due to the nature of his wound.

## 2024-08-26 ENCOUNTER — TELEPHONE (OUTPATIENT)
Age: 44
End: 2024-08-26

## 2024-08-26 NOTE — TELEPHONE ENCOUNTER
Incoming call from [facility] @ [phone] spoke with farooq roldan. She stated that she received a referral for this pt. They do not accept [employer] [facility] medicaid they only accept [employer] [facility] medicare.

## 2024-08-27 ENCOUNTER — NURSE ONLY (OUTPATIENT)
Age: 44
End: 2024-08-27

## 2024-08-27 NOTE — PROGRESS NOTES
[facility]  [address],  [address]  [facility] [address]  Dept: [phone]           Chart reviewed for the following:   Gisele WALLER MA, have reviewed the medications and updated the Allergic reactions for Maximo Balderrama     TIME OUT performed immediately prior to start of procedure:   Gisele WALLER MA, have performed the following reviews on Maximo Balderrama prior to the start of the procedure:            * Patient was identified by name and date of birth   * Agreement that nathalia boot removed and reapplied   * Procedure site verified   * Patient was positioned for comfort  * Verbal consent was given by patient     Time: 1400      Date of procedure: 8/27/2024    Procedure performed by:  [facility] NURSE    How tolerated by patient: Doing much better. Left leg no open wounds, right leg no drainage                                                                                     Comments:  Applied Double tubi- to left leg. Applied hydrofera blue, allevyn, and unna boot with kerlix and coban to right leg.

## 2024-09-09 ENCOUNTER — OFFICE VISIT (OUTPATIENT)
Age: 44
End: 2024-09-09
Payer: COMMERCIAL

## 2024-09-09 VITALS
RESPIRATION RATE: 18 BRPM | BODY MASS INDEX: 37.8 KG/M2 | DIASTOLIC BLOOD PRESSURE: 76 MMHG | HEIGHT: 71 IN | HEART RATE: 94 BPM | OXYGEN SATURATION: 96 % | SYSTOLIC BLOOD PRESSURE: 126 MMHG | WEIGHT: 270 LBS

## 2024-09-09 DIAGNOSIS — L97.929 VENOUS ULCER OF LEFT LOWER EXTREMITY WITH VARICOSE VEINS (HCC): ICD-10-CM

## 2024-09-09 DIAGNOSIS — I89.0 LYMPHEDEMA: Primary | ICD-10-CM

## 2024-09-09 DIAGNOSIS — L97.911 SKIN ULCER OF RIGHT LOWER LEG, LIMITED TO BREAKDOWN OF SKIN (HCC): ICD-10-CM

## 2024-09-09 DIAGNOSIS — I83.029 VENOUS ULCER OF LEFT LOWER EXTREMITY WITH VARICOSE VEINS (HCC): ICD-10-CM

## 2024-09-09 PROCEDURE — 99214 OFFICE O/P EST MOD 30 MIN: CPT | Performed by: SURGERY

## 2024-09-09 PROCEDURE — 29580 STRAPPING UNNA BOOT: CPT | Performed by: SURGERY

## 2024-09-09 RX ORDER — DOXYCYCLINE HYCLATE 100 MG
100 TABLET ORAL 2 TIMES DAILY
Qty: 28 TABLET | Refills: 0 | Status: SHIPPED | OUTPATIENT
Start: 2024-09-09 | End: 2024-09-23

## 2024-09-10 PROBLEM — L97.929 VENOUS ULCER OF LEFT LOWER EXTREMITY WITH VARICOSE VEINS (HCC): Status: ACTIVE | Noted: 2024-09-10

## 2024-09-10 PROBLEM — I83.029 VENOUS ULCER OF LEFT LOWER EXTREMITY WITH VARICOSE VEINS (HCC): Status: ACTIVE | Noted: 2024-09-10
